# Patient Record
Sex: FEMALE | Race: WHITE | NOT HISPANIC OR LATINO | Employment: OTHER | RURAL
[De-identification: names, ages, dates, MRNs, and addresses within clinical notes are randomized per-mention and may not be internally consistent; named-entity substitution may affect disease eponyms.]

---

## 2021-06-22 RX ORDER — DONEPEZIL HYDROCHLORIDE 10 MG/1
1 TABLET, FILM COATED ORAL NIGHTLY
COMMUNITY
Start: 2020-09-10 | End: 2021-12-17 | Stop reason: SDUPTHER

## 2021-06-22 RX ORDER — GABAPENTIN 100 MG/1
1 CAPSULE ORAL 3 TIMES DAILY
COMMUNITY
Start: 2020-09-10 | End: 2021-10-14 | Stop reason: SDUPTHER

## 2021-06-22 RX ORDER — MELOXICAM 7.5 MG/1
1 TABLET ORAL 2 TIMES DAILY WITH MEALS
COMMUNITY
Start: 2020-09-10 | End: 2022-09-06

## 2021-06-22 RX ORDER — ESCITALOPRAM OXALATE 20 MG/1
1 TABLET ORAL DAILY
COMMUNITY
Start: 2020-09-10 | End: 2021-12-17 | Stop reason: SDUPTHER

## 2021-06-22 RX ORDER — MEMANTINE HYDROCHLORIDE 28 MG/1
1 CAPSULE, EXTENDED RELEASE ORAL DAILY
COMMUNITY
Start: 2020-09-10 | End: 2021-12-17 | Stop reason: SDUPTHER

## 2021-06-22 RX ORDER — IBANDRONATE SODIUM 150 MG/1
1 TABLET, FILM COATED ORAL
COMMUNITY
Start: 2020-09-10 | End: 2022-09-06 | Stop reason: SDUPTHER

## 2021-07-06 ENCOUNTER — OFFICE VISIT (OUTPATIENT)
Dept: FAMILY MEDICINE | Facility: CLINIC | Age: 86
End: 2021-07-06
Payer: MEDICARE

## 2021-07-06 VITALS
WEIGHT: 144 LBS | HEIGHT: 64 IN | TEMPERATURE: 98 F | OXYGEN SATURATION: 94 % | DIASTOLIC BLOOD PRESSURE: 83 MMHG | SYSTOLIC BLOOD PRESSURE: 149 MMHG | RESPIRATION RATE: 18 BRPM | HEART RATE: 83 BPM | BODY MASS INDEX: 24.59 KG/M2

## 2021-07-06 DIAGNOSIS — E07.9 DISEASE OF THYROID GLAND: ICD-10-CM

## 2021-07-06 DIAGNOSIS — Z00.00 ANNUAL PHYSICAL EXAM: Primary | ICD-10-CM

## 2021-07-06 DIAGNOSIS — Z85.72 HISTORY OF LYMPHOMA: ICD-10-CM

## 2021-07-06 DIAGNOSIS — G30.9 DEMENTIA DUE TO ALZHEIMER'S DISEASE: ICD-10-CM

## 2021-07-06 DIAGNOSIS — F02.80 DEMENTIA DUE TO ALZHEIMER'S DISEASE: ICD-10-CM

## 2021-07-06 DIAGNOSIS — L81.9 PIGMENTED SKIN LESIONS: ICD-10-CM

## 2021-07-06 DIAGNOSIS — H61.20 EXCESSIVE CERUMEN IN EAR CANAL, UNSPECIFIED LATERALITY: ICD-10-CM

## 2021-07-06 LAB
ANION GAP SERPL CALCULATED.3IONS-SCNC: 9 MMOL/L (ref 7–16)
BASOPHILS # BLD AUTO: 0.07 K/UL (ref 0–0.2)
BASOPHILS NFR BLD AUTO: 0.7 % (ref 0–1)
BUN SERPL-MCNC: 18 MG/DL (ref 7–18)
BUN/CREAT SERPL: 18 (ref 6–20)
CALCIUM SERPL-MCNC: 9.1 MG/DL (ref 8.5–10.1)
CHLORIDE SERPL-SCNC: 107 MMOL/L (ref 98–107)
CO2 SERPL-SCNC: 29 MMOL/L (ref 21–32)
CREAT SERPL-MCNC: 1.02 MG/DL (ref 0.55–1.02)
DIFFERENTIAL METHOD BLD: ABNORMAL
EOSINOPHIL # BLD AUTO: 0.11 K/UL (ref 0–0.5)
EOSINOPHIL NFR BLD AUTO: 1.1 % (ref 1–4)
ERYTHROCYTE [DISTWIDTH] IN BLOOD BY AUTOMATED COUNT: 14.3 % (ref 11.5–14.5)
GLUCOSE SERPL-MCNC: 91 MG/DL (ref 74–106)
HCT VFR BLD AUTO: 39 % (ref 38–47)
HGB BLD-MCNC: 12.1 G/DL (ref 12–16)
IMM GRANULOCYTES # BLD AUTO: 0.03 K/UL (ref 0–0.04)
IMM GRANULOCYTES NFR BLD: 0.3 % (ref 0–0.4)
LYMPHOCYTES # BLD AUTO: 2.22 K/UL (ref 1–4.8)
LYMPHOCYTES NFR BLD AUTO: 21.6 % (ref 27–41)
MCH RBC QN AUTO: 28.5 PG (ref 27–31)
MCHC RBC AUTO-ENTMCNC: 31 G/DL (ref 32–36)
MCV RBC AUTO: 91.8 FL (ref 80–96)
MONOCYTES # BLD AUTO: 0.85 K/UL (ref 0–0.8)
MONOCYTES NFR BLD AUTO: 8.3 % (ref 2–6)
MPC BLD CALC-MCNC: 11.5 FL (ref 9.4–12.4)
NEUTROPHILS # BLD AUTO: 7.01 K/UL (ref 1.8–7.7)
NEUTROPHILS NFR BLD AUTO: 68 % (ref 53–65)
NRBC # BLD AUTO: 0 X10E3/UL
NRBC, AUTO (.00): 0 %
PLATELET # BLD AUTO: 303 K/UL (ref 150–400)
POTASSIUM SERPL-SCNC: 3.9 MMOL/L (ref 3.5–5.1)
RBC # BLD AUTO: 4.25 M/UL (ref 4.2–5.4)
SODIUM SERPL-SCNC: 141 MMOL/L (ref 136–145)
TSH SERPL DL<=0.005 MIU/L-ACNC: 2.73 UIU/ML (ref 0.36–3.74)
WBC # BLD AUTO: 10.29 K/UL (ref 4.5–11)

## 2021-07-06 PROCEDURE — 84443 ASSAY THYROID STIM HORMONE: CPT | Mod: ICN,,, | Performed by: CLINICAL MEDICAL LABORATORY

## 2021-07-06 PROCEDURE — 85025 COMPLETE CBC W/AUTO DIFF WBC: CPT | Mod: ICN,,, | Performed by: CLINICAL MEDICAL LABORATORY

## 2021-07-06 PROCEDURE — 99213 OFFICE O/P EST LOW 20 MIN: CPT | Mod: GC,,, | Performed by: FAMILY MEDICINE

## 2021-07-06 PROCEDURE — 80048 BASIC METABOLIC PNL TOTAL CA: CPT | Mod: ICN,,, | Performed by: CLINICAL MEDICAL LABORATORY

## 2021-07-06 PROCEDURE — 85025 CBC WITH DIFFERENTIAL: ICD-10-PCS | Mod: ICN,,, | Performed by: CLINICAL MEDICAL LABORATORY

## 2021-07-06 PROCEDURE — 84443 TSH: ICD-10-PCS | Mod: ICN,,, | Performed by: CLINICAL MEDICAL LABORATORY

## 2021-07-06 PROCEDURE — 80048 BASIC METABOLIC PANEL: ICD-10-PCS | Mod: ICN,,, | Performed by: CLINICAL MEDICAL LABORATORY

## 2021-07-06 PROCEDURE — 99213 PR OFFICE/OUTPT VISIT, EST, LEVL III, 20-29 MIN: ICD-10-PCS | Mod: GC,,, | Performed by: FAMILY MEDICINE

## 2021-07-06 RX ORDER — ACETAMINOPHEN AND CODEINE PHOSPHATE 300; 30 MG/1; MG/1
TABLET ORAL
COMMUNITY
End: 2021-07-06 | Stop reason: ALTCHOICE

## 2021-07-06 RX ORDER — DICLOFENAC SODIUM 10 MG/G
2 GEL TOPICAL DAILY
COMMUNITY
End: 2022-09-06 | Stop reason: SDUPTHER

## 2021-07-13 ENCOUNTER — OFFICE VISIT (OUTPATIENT)
Dept: OTOLARYNGOLOGY | Facility: CLINIC | Age: 86
End: 2021-07-13
Payer: MEDICARE

## 2021-07-13 VITALS — WEIGHT: 144 LBS | BODY MASS INDEX: 24.59 KG/M2 | HEIGHT: 64 IN

## 2021-07-13 DIAGNOSIS — H90.3 SENSORINEURAL HEARING LOSS (SNHL) OF BOTH EARS: ICD-10-CM

## 2021-07-13 DIAGNOSIS — H61.23 BILATERAL IMPACTED CERUMEN: Primary | ICD-10-CM

## 2021-07-13 PROCEDURE — 69210 REMOVE IMPACTED EAR WAX UNI: CPT | Mod: 50,PBBFAC | Performed by: OTOLARYNGOLOGY

## 2021-07-13 PROCEDURE — 69210 PR REMOVAL IMPACTED CERUMEN REQUIRING INSTRUMENTATION, UNILATERAL: ICD-10-PCS | Mod: S$PBB,,, | Performed by: OTOLARYNGOLOGY

## 2021-07-13 PROCEDURE — 69210 REMOVE IMPACTED EAR WAX UNI: CPT | Mod: S$PBB,,, | Performed by: OTOLARYNGOLOGY

## 2021-07-13 PROCEDURE — 99213 OFFICE O/P EST LOW 20 MIN: CPT | Mod: PBBFAC | Performed by: OTOLARYNGOLOGY

## 2021-07-13 PROCEDURE — 99204 OFFICE O/P NEW MOD 45 MIN: CPT | Mod: S$PBB,25,, | Performed by: OTOLARYNGOLOGY

## 2021-07-13 PROCEDURE — 99204 PR OFFICE/OUTPT VISIT, NEW, LEVL IV, 45-59 MIN: ICD-10-PCS | Mod: S$PBB,25,, | Performed by: OTOLARYNGOLOGY

## 2021-10-11 PROBLEM — Z00.00 ANNUAL PHYSICAL EXAM: Status: RESOLVED | Noted: 2021-07-06 | Resolved: 2021-10-11

## 2021-10-14 RX ORDER — GABAPENTIN 100 MG/1
100 CAPSULE ORAL 3 TIMES DAILY
Qty: 90 CAPSULE | Refills: 3 | Status: SHIPPED | OUTPATIENT
Start: 2021-10-14 | End: 2022-06-07 | Stop reason: SDUPTHER

## 2021-12-21 RX ORDER — ESCITALOPRAM OXALATE 20 MG/1
20 TABLET ORAL DAILY
Qty: 90 TABLET | Refills: 0 | Status: SHIPPED | OUTPATIENT
Start: 2021-12-21 | End: 2021-12-21

## 2021-12-21 RX ORDER — ESCITALOPRAM OXALATE 20 MG/1
20 TABLET ORAL DAILY
Qty: 30 TABLET | Refills: 2 | Status: SHIPPED | OUTPATIENT
Start: 2021-12-21 | End: 2022-06-07 | Stop reason: SDUPTHER

## 2021-12-21 RX ORDER — MEMANTINE HYDROCHLORIDE 28 MG/1
28 CAPSULE, EXTENDED RELEASE ORAL DAILY
Qty: 30 CAPSULE | Refills: 2 | Status: SHIPPED | OUTPATIENT
Start: 2021-12-21 | End: 2022-03-21

## 2021-12-21 RX ORDER — MEMANTINE HYDROCHLORIDE 28 MG/1
28 CAPSULE, EXTENDED RELEASE ORAL DAILY
Qty: 90 CAPSULE | Refills: 0 | Status: SHIPPED | OUTPATIENT
Start: 2021-12-21 | End: 2021-12-21

## 2021-12-21 RX ORDER — DONEPEZIL HYDROCHLORIDE 10 MG/1
10 TABLET, FILM COATED ORAL NIGHTLY
Qty: 90 TABLET | Refills: 0 | Status: SHIPPED | OUTPATIENT
Start: 2021-12-21 | End: 2021-12-21

## 2021-12-21 RX ORDER — DONEPEZIL HYDROCHLORIDE 10 MG/1
10 TABLET, FILM COATED ORAL NIGHTLY
Qty: 30 TABLET | Refills: 2 | Status: SHIPPED | OUTPATIENT
Start: 2021-12-21 | End: 2022-06-07 | Stop reason: ALTCHOICE

## 2022-06-07 ENCOUNTER — OFFICE VISIT (OUTPATIENT)
Dept: FAMILY MEDICINE | Facility: CLINIC | Age: 87
End: 2022-06-07
Payer: MEDICARE

## 2022-06-07 VITALS
WEIGHT: 138.81 LBS | HEART RATE: 74 BPM | OXYGEN SATURATION: 95 % | TEMPERATURE: 98 F | SYSTOLIC BLOOD PRESSURE: 137 MMHG | BODY MASS INDEX: 23.82 KG/M2 | DIASTOLIC BLOOD PRESSURE: 75 MMHG

## 2022-06-07 DIAGNOSIS — F32.A DEPRESSION, UNSPECIFIED DEPRESSION TYPE: ICD-10-CM

## 2022-06-07 DIAGNOSIS — G30.9 ALZHEIMER DISEASE: Primary | ICD-10-CM

## 2022-06-07 DIAGNOSIS — Z00.00 ANNUAL VISIT FOR GENERAL ADULT MEDICAL EXAMINATION WITHOUT ABNORMAL FINDINGS: ICD-10-CM

## 2022-06-07 DIAGNOSIS — Z85.72 HISTORY OF LYMPHOMA: ICD-10-CM

## 2022-06-07 DIAGNOSIS — L98.9 SKIN LESIONS: ICD-10-CM

## 2022-06-07 DIAGNOSIS — G62.9 NEUROPATHY: ICD-10-CM

## 2022-06-07 DIAGNOSIS — F02.80 ALZHEIMER DISEASE: Primary | ICD-10-CM

## 2022-06-07 LAB
BASOPHILS # BLD AUTO: 0.08 K/UL (ref 0–0.2)
BASOPHILS NFR BLD AUTO: 0.8 % (ref 0–1)
DIFFERENTIAL METHOD BLD: ABNORMAL
EOSINOPHIL # BLD AUTO: 0.11 K/UL (ref 0–0.5)
EOSINOPHIL NFR BLD AUTO: 1.2 % (ref 1–4)
ERYTHROCYTE [DISTWIDTH] IN BLOOD BY AUTOMATED COUNT: 14.3 % (ref 11.5–14.5)
HCT VFR BLD AUTO: 38 % (ref 38–47)
HGB BLD-MCNC: 12.2 G/DL (ref 12–16)
IMM GRANULOCYTES # BLD AUTO: 0.03 K/UL (ref 0–0.04)
IMM GRANULOCYTES NFR BLD: 0.3 % (ref 0–0.4)
LYMPHOCYTES # BLD AUTO: 2.41 K/UL (ref 1–4.8)
LYMPHOCYTES NFR BLD AUTO: 25.3 % (ref 27–41)
MCH RBC QN AUTO: 30.3 PG (ref 27–31)
MCHC RBC AUTO-ENTMCNC: 32.1 G/DL (ref 32–36)
MCV RBC AUTO: 94.3 FL (ref 80–96)
MONOCYTES # BLD AUTO: 0.81 K/UL (ref 0–0.8)
MONOCYTES NFR BLD AUTO: 8.5 % (ref 2–6)
MPC BLD CALC-MCNC: 11.4 FL (ref 9.4–12.4)
NEUTROPHILS # BLD AUTO: 6.1 K/UL (ref 1.8–7.7)
NEUTROPHILS NFR BLD AUTO: 63.9 % (ref 53–65)
NRBC # BLD AUTO: 0 X10E3/UL
NRBC, AUTO (.00): 0 %
PLATELET # BLD AUTO: 248 K/UL (ref 150–400)
RBC # BLD AUTO: 4.03 M/UL (ref 4.2–5.4)
WBC # BLD AUTO: 9.54 K/UL (ref 4.5–11)

## 2022-06-07 PROCEDURE — 99214 PR OFFICE/OUTPT VISIT, EST, LEVL IV, 30-39 MIN: ICD-10-PCS | Mod: GC,,, | Performed by: SPECIALIST

## 2022-06-07 PROCEDURE — 99214 OFFICE O/P EST MOD 30 MIN: CPT | Mod: GC,,, | Performed by: SPECIALIST

## 2022-06-07 PROCEDURE — 80053 COMPREHEN METABOLIC PANEL: CPT | Mod: ,,, | Performed by: CLINICAL MEDICAL LABORATORY

## 2022-06-07 PROCEDURE — 85025 CBC WITH DIFFERENTIAL: ICD-10-PCS | Mod: ,,, | Performed by: CLINICAL MEDICAL LABORATORY

## 2022-06-07 PROCEDURE — 85025 COMPLETE CBC W/AUTO DIFF WBC: CPT | Mod: ,,, | Performed by: CLINICAL MEDICAL LABORATORY

## 2022-06-07 PROCEDURE — 80053 COMPREHENSIVE METABOLIC PANEL: ICD-10-PCS | Mod: ,,, | Performed by: CLINICAL MEDICAL LABORATORY

## 2022-06-07 RX ORDER — GABAPENTIN 100 MG/1
100 CAPSULE ORAL 3 TIMES DAILY
Qty: 270 CAPSULE | Refills: 1 | Status: SHIPPED | OUTPATIENT
Start: 2022-06-07 | End: 2022-09-06 | Stop reason: SDUPTHER

## 2022-06-07 RX ORDER — ESCITALOPRAM OXALATE 20 MG/1
20 TABLET ORAL DAILY
Qty: 90 TABLET | Refills: 1 | Status: SHIPPED | OUTPATIENT
Start: 2022-06-07 | End: 2022-08-31 | Stop reason: SDUPTHER

## 2022-06-07 RX ORDER — DONEPEZIL HYDROCHLORIDE 5 MG/1
5 TABLET, FILM COATED ORAL 2 TIMES DAILY
Qty: 180 TABLET | Refills: 1 | Status: SHIPPED | OUTPATIENT
Start: 2022-06-07 | End: 2022-09-06 | Stop reason: SDUPTHER

## 2022-06-07 RX ORDER — MEMANTINE HYDROCHLORIDE 7 MG/1
7 CAPSULE, EXTENDED RELEASE ORAL 3 TIMES DAILY
Qty: 270 CAPSULE | Refills: 1 | Status: SHIPPED | OUTPATIENT
Start: 2022-06-07 | End: 2022-09-06 | Stop reason: SDUPTHER

## 2022-06-07 RX ORDER — MEMANTINE HYDROCHLORIDE 28 MG/1
1 CAPSULE, EXTENDED RELEASE ORAL DAILY
COMMUNITY
Start: 2022-05-08 | End: 2022-06-07 | Stop reason: ALTCHOICE

## 2022-06-07 NOTE — PROGRESS NOTES
"Subjective:       Patient ID: Acacia Mcgrath is a 89 y.o. female.    Chief Complaint: Medication Refill (Gabapentin, aricept, lexapro, memantine ) and Follow-up (Frequent falls )    89 year old female came in for a follow up and medication refill. Patient has PMHx of dementia and daughter reports she has become " out of it" recently when she takes her medications. She believes her medications are too strong for her and she would like her to try a lower dose of these medications. Patient also has multiple skin lesions on the forehead and all over the body.  Patient would like to refill all her medications.     Medication Refill  Associated symptoms include a rash.   Follow-up  Associated symptoms include a rash.     Review of Systems   Constitutional: Positive for activity change.   HENT: Positive for hearing loss.    Integumentary:  Positive for rash.   Neurological: Positive for disturbances in coordination and coordination difficulties.   Psychiatric/Behavioral: Positive for confusion, dysphoric mood and sleep disturbance.   All other systems reviewed and are negative.        Objective:      Physical Exam  Constitutional:       General: She is not in acute distress.     Appearance: Normal appearance. She is not toxic-appearing.   HENT:      Head: Normocephalic.      Right Ear: External ear normal. There is impacted cerumen.      Left Ear: External ear normal. There is impacted cerumen.      Nose: Nose normal.      Mouth/Throat:      Mouth: Mucous membranes are moist.   Eyes:      General:         Right eye: No discharge.         Left eye: No discharge.      Conjunctiva/sclera: Conjunctivae normal.   Cardiovascular:      Rate and Rhythm: Regular rhythm. Bradycardia present.      Pulses: Normal pulses.      Heart sounds: Normal heart sounds. No murmur heard.    No friction rub.   Pulmonary:      Effort: Pulmonary effort is normal. No respiratory distress.      Breath sounds: Normal breath sounds. No wheezing. "   Skin:     Findings: Lesion and rash present.   Neurological:      Mental Status: She is alert and oriented to person, place, and time.   Psychiatric:         Behavior: Behavior normal.         Assessment:       Problem List Items Addressed This Visit        Oncology    History of lymphoma    Relevant Orders    Comprehensive Metabolic Panel    CBC Auto Differential      Other Visit Diagnoses     Alzheimer disease    -  Primary    Relevant Medications    memantine (NAMENDA XR) 7 mg CSpX    donepeziL (ARICEPT) 5 MG tablet    Neuropathy        Relevant Medications    gabapentin (NEURONTIN) 100 MG capsule    Depression, unspecified depression type        Relevant Medications    EScitalopram oxalate (LEXAPRO) 20 MG tablet    Skin lesions        Relevant Orders    Ambulatory referral/consult to Dermatology    Annual visit for general adult medical examination without abnormal findings        Relevant Orders    Comprehensive Metabolic Panel    CBC Auto Differential          Plan:       Alzheimer disease  -     memantine (NAMENDA XR) 7 mg CSpX; Take 1 capsule (7 mg total) by mouth 3 (three) times daily.  Dispense: 270 capsule; Refill: 1  -     donepeziL (ARICEPT) 5 MG tablet; Take 1 tablet (5 mg total) by mouth 2 (two) times a day.  Dispense: 180 tablet; Refill: 1    Neuropathy  -     gabapentin (NEURONTIN) 100 MG capsule; Take 1 capsule (100 mg total) by mouth 3 (three) times daily.  Dispense: 270 capsule; Refill: 1    Depression, unspecified depression type  -     EScitalopram oxalate (LEXAPRO) 20 MG tablet; Take 1 tablet (20 mg total) by mouth Daily.  Dispense: 90 tablet; Refill: 1    Skin lesions  -     Ambulatory referral/consult to Dermatology; Future; Expected date: 06/14/2022    Annual visit for general adult medical examination without abnormal findings  -     Comprehensive Metabolic Panel; Future; Expected date: 06/07/2022  -     CBC Auto Differential; Future; Expected date: 06/07/2022    History of lymphoma  -      Comprehensive Metabolic Panel; Future; Expected date: 06/07/2022  -     CBC Auto Differential; Future; Expected date: 06/07/2022

## 2022-06-07 NOTE — ASSESSMENT & PLAN NOTE
Patient was on Donepezil 10 mg and we changed the dosage to 5 mg BID. Patient will start taking 5 mg daily and if she did not tolerate it she will continue the 10 mg  Patient was taking memantine 28 mg but she will start taking memantine 7mg  x3 times daily

## 2022-06-08 LAB
ALBUMIN SERPL BCP-MCNC: 3.5 G/DL (ref 3.5–5)
ALBUMIN/GLOB SERPL: 1.1 {RATIO}
ALP SERPL-CCNC: 102 U/L (ref 55–142)
ALT SERPL W P-5'-P-CCNC: 19 U/L (ref 13–56)
ANION GAP SERPL CALCULATED.3IONS-SCNC: 12 MMOL/L (ref 7–16)
AST SERPL W P-5'-P-CCNC: 16 U/L (ref 15–37)
BILIRUB SERPL-MCNC: 0.4 MG/DL (ref 0–1.2)
BUN SERPL-MCNC: 15 MG/DL (ref 7–18)
BUN/CREAT SERPL: 16 (ref 6–20)
CALCIUM SERPL-MCNC: 9.4 MG/DL (ref 8.5–10.1)
CHLORIDE SERPL-SCNC: 104 MMOL/L (ref 98–107)
CO2 SERPL-SCNC: 29 MMOL/L (ref 21–32)
CREAT SERPL-MCNC: 0.92 MG/DL (ref 0.55–1.02)
GLOBULIN SER-MCNC: 3.1 G/DL (ref 2–4)
GLUCOSE SERPL-MCNC: 91 MG/DL (ref 74–106)
POTASSIUM SERPL-SCNC: 4.3 MMOL/L (ref 3.5–5.1)
PROT SERPL-MCNC: 6.6 G/DL (ref 6.4–8.2)
SODIUM SERPL-SCNC: 141 MMOL/L (ref 136–145)

## 2022-08-31 DIAGNOSIS — F32.A DEPRESSION, UNSPECIFIED DEPRESSION TYPE: ICD-10-CM

## 2022-09-01 NOTE — TELEPHONE ENCOUNTER
----- Message from Juan Luis Moscoso sent at 9/1/2022  8:34 AM CDT -----  EScitalopram oxalate to [facility] and also needs a home visit [phone]

## 2022-09-06 ENCOUNTER — OFFICE VISIT (OUTPATIENT)
Dept: FAMILY MEDICINE | Facility: CLINIC | Age: 87
End: 2022-09-06
Payer: MEDICARE

## 2022-09-06 VITALS
HEART RATE: 88 BPM | WEIGHT: 137.63 LBS | BODY MASS INDEX: 23.62 KG/M2 | DIASTOLIC BLOOD PRESSURE: 75 MMHG | SYSTOLIC BLOOD PRESSURE: 155 MMHG | OXYGEN SATURATION: 96 % | TEMPERATURE: 99 F

## 2022-09-06 DIAGNOSIS — F02.80 ALZHEIMER DISEASE: ICD-10-CM

## 2022-09-06 DIAGNOSIS — F32.A DEPRESSION, UNSPECIFIED DEPRESSION TYPE: ICD-10-CM

## 2022-09-06 DIAGNOSIS — Z23 IMMUNIZATION DUE: ICD-10-CM

## 2022-09-06 DIAGNOSIS — Z23 FLU VACCINE NEED: Primary | ICD-10-CM

## 2022-09-06 DIAGNOSIS — G62.9 NEUROPATHY: ICD-10-CM

## 2022-09-06 DIAGNOSIS — Z23: ICD-10-CM

## 2022-09-06 DIAGNOSIS — Z53.20 PATIENT REFUSED EVALUATION OR TREATMENT: ICD-10-CM

## 2022-09-06 DIAGNOSIS — G30.9 ALZHEIMER DISEASE: ICD-10-CM

## 2022-09-06 DIAGNOSIS — M81.0 OSTEOPOROSIS, UNSPECIFIED OSTEOPOROSIS TYPE, UNSPECIFIED PATHOLOGICAL FRACTURE PRESENCE: ICD-10-CM

## 2022-09-06 PROCEDURE — 99213 OFFICE O/P EST LOW 20 MIN: CPT | Mod: GC,,, | Performed by: FAMILY MEDICINE

## 2022-09-06 PROCEDURE — G0009 ADMIN PNEUMOCOCCAL VACCINE: HCPCS | Mod: GC,,, | Performed by: FAMILY MEDICINE

## 2022-09-06 PROCEDURE — 90677 PNEUMOCOCCAL CONJUGATE VACCINE 20-VALENT: ICD-10-PCS | Mod: GC,,, | Performed by: FAMILY MEDICINE

## 2022-09-06 PROCEDURE — G0009 PNEUMOCOCCAL CONJUGATE VACCINE 20-VALENT: ICD-10-PCS | Mod: GC,,, | Performed by: FAMILY MEDICINE

## 2022-09-06 PROCEDURE — 99213 PR OFFICE/OUTPT VISIT, EST, LEVL III, 20-29 MIN: ICD-10-PCS | Mod: GC,,, | Performed by: FAMILY MEDICINE

## 2022-09-06 PROCEDURE — 90677 PCV20 VACCINE IM: CPT | Mod: GC,,, | Performed by: FAMILY MEDICINE

## 2022-09-06 RX ORDER — ESCITALOPRAM OXALATE 20 MG/1
20 TABLET ORAL DAILY
Qty: 90 TABLET | Refills: 1 | Status: SHIPPED | OUTPATIENT
Start: 2022-09-06 | End: 2022-09-06 | Stop reason: SDUPTHER

## 2022-09-06 RX ORDER — DONEPEZIL HYDROCHLORIDE 5 MG/1
5 TABLET, FILM COATED ORAL 2 TIMES DAILY
Qty: 180 TABLET | Refills: 1 | Status: SHIPPED | OUTPATIENT
Start: 2022-09-06 | End: 2022-12-12 | Stop reason: SDUPTHER

## 2022-09-06 RX ORDER — MEMANTINE HYDROCHLORIDE 7 MG/1
7 CAPSULE, EXTENDED RELEASE ORAL 3 TIMES DAILY
Qty: 270 CAPSULE | Refills: 1 | Status: SHIPPED | OUTPATIENT
Start: 2022-09-06 | End: 2022-12-12 | Stop reason: SDUPTHER

## 2022-09-06 RX ORDER — ESCITALOPRAM OXALATE 20 MG/1
20 TABLET ORAL DAILY
Qty: 90 TABLET | Refills: 1 | Status: SHIPPED | OUTPATIENT
Start: 2022-09-06 | End: 2022-12-12 | Stop reason: SDUPTHER

## 2022-09-06 RX ORDER — DICLOFENAC SODIUM 10 MG/G
2 GEL TOPICAL DAILY
Qty: 200 G | Refills: 0 | Status: SHIPPED | OUTPATIENT
Start: 2022-09-06 | End: 2022-12-12 | Stop reason: SDUPTHER

## 2022-09-06 RX ORDER — IBANDRONATE SODIUM 150 MG/1
150 TABLET, FILM COATED ORAL
Qty: 3 TABLET | Refills: 1 | Status: SHIPPED | OUTPATIENT
Start: 2022-09-06 | End: 2022-12-12 | Stop reason: SDUPTHER

## 2022-09-06 RX ORDER — GABAPENTIN 100 MG/1
100 CAPSULE ORAL 3 TIMES DAILY
Qty: 270 CAPSULE | Refills: 1 | Status: SHIPPED | OUTPATIENT
Start: 2022-09-06 | End: 2022-12-12 | Stop reason: SDUPTHER

## 2022-09-06 NOTE — PROGRESS NOTES
Subjective:       Patient ID: Acacia Mcgrath is a 89 y.o. female.    Chief Complaint: Follow-up and Medication Refill (Aricept, lexapro, neurontin, namenda)    89 year old female with PMHx of dementia came in for a follow up and refilling medications. Patient has been doing well and she is in similar state since last visit. Daughter reports she has made sure she is compliant with her medications. She has not had a flu shot, 4th covid booster, second pneumococcal after 65 and no shingrix. Patient used to take mobic and voltaren gel for arthritis but daughter reports she has had not recent complaints of pain and arthritis therefore she has not took these medications. She does take her gabapentin for nerve pains. Patient stopped taking ibandronate in the past even though she has been diagnosed with osteoporosis.   Review of Systems   Constitutional:  Positive for activity change.   HENT:  Positive for hearing loss.    Integumentary:  Positive for rash.   Neurological:  Positive for coordination difficulties and coordination difficulties.   Psychiatric/Behavioral:  Positive for confusion, dysphoric mood and sleep disturbance.    All other systems reviewed and are negative.      Objective:      Physical Exam  Constitutional:       General: She is not in acute distress.     Appearance: Normal appearance. She is not toxic-appearing.   HENT:      Head: Normocephalic.      Right Ear: External ear normal.      Left Ear: External ear normal.      Nose: Nose normal.      Mouth/Throat:      Mouth: Mucous membranes are moist.   Eyes:      General:         Right eye: No discharge.         Left eye: No discharge.      Conjunctiva/sclera: Conjunctivae normal.   Cardiovascular:      Rate and Rhythm: Regular rhythm. Bradycardia present.      Pulses: Normal pulses.      Heart sounds: Normal heart sounds. No murmur heard.    No friction rub.   Pulmonary:      Effort: Pulmonary effort is normal. No respiratory distress.      Breath  sounds: Normal breath sounds. No wheezing.   Skin:     Findings: Lesion and rash present.   Neurological:      Mental Status: She is alert and oriented to person, place, and time.   Psychiatric:         Behavior: Behavior normal.       Assessment:       Problem List Items Addressed This Visit          Neuro    Alzheimer disease     Meds were refilled         Relevant Medications    donepeziL (ARICEPT) 5 MG tablet    memantine (NAMENDA XR) 7 mg CSpX    Neuropathy     Gabapentin was refilled         Relevant Medications    gabapentin (NEURONTIN) 100 MG capsule       Psychiatric    Depression     lexapro was refilled         Relevant Medications    EScitalopram oxalate (LEXAPRO) 20 MG tablet       ID    Pneumococcal 7-valent conjugate vaccine administered     Vaccine was administered today         Relevant Orders    (In Office Administered) Pneumococcal Polysaccharide Vaccine (23 Valent) (SQ/IM)    Flu vaccine need - Primary     Vaccine was ordered patient will come back to get it         Relevant Orders    Influenza - Quadrivalent *Preferred* (6 months+) (PF)       Palliative Care    Patient refused evaluation or treatment     Patient will return another day for covid 4th shot, flu vaccine and shingrix  Patient will need to have a lipid panel but would like to do the lab work when he is due for more lab works which will be in 6 months           Other Visit Diagnoses       Osteoporosis, unspecified osteoporosis type, unspecified pathological fracture presence        Relevant Medications    ibandronate (BONIVA) 150 mg tablet              Plan:       Flu vaccine need  -     Influenza - Quadrivalent *Preferred* (6 months+) (PF)    Pneumococcal 7-valent conjugate vaccine administered  -     (In Office Administered) Pneumococcal Polysaccharide Vaccine (23 Valent) (SQ/IM)    Alzheimer disease  -     donepeziL (ARICEPT) 5 MG tablet; Take 1 tablet (5 mg total) by mouth 2 (two) times a day.  Dispense: 180 tablet; Refill: 1  -      memantine (NAMENDA XR) 7 mg CSpX; Take 1 capsule (7 mg total) by mouth 3 (three) times daily.  Dispense: 270 capsule; Refill: 1    Depression, unspecified depression type  -     EScitalopram oxalate (LEXAPRO) 20 MG tablet; Take 1 tablet (20 mg total) by mouth Daily.  Dispense: 90 tablet; Refill: 1    Neuropathy  -     gabapentin (NEURONTIN) 100 MG capsule; Take 1 capsule (100 mg total) by mouth 3 (three) times daily.  Dispense: 270 capsule; Refill: 1    Osteoporosis, unspecified osteoporosis type, unspecified pathological fracture presence  -     ibandronate (BONIVA) 150 mg tablet; Take 1 tablet (150 mg total) by mouth every 30 days.  Dispense: 3 tablet; Refill: 1    Patient refused evaluation or treatment    Other orders  -     diclofenac sodium (VOLTAREN) 1 % Gel; Apply 2 g topically once daily.  Dispense: 200 g; Refill: 0

## 2022-09-09 DIAGNOSIS — Z71.89 COMPLEX CARE COORDINATION: ICD-10-CM

## 2022-10-04 ENCOUNTER — CLINICAL SUPPORT (OUTPATIENT)
Dept: FAMILY MEDICINE | Facility: CLINIC | Age: 87
End: 2022-10-04
Payer: MEDICARE

## 2022-10-04 DIAGNOSIS — Z23 NEED FOR VACCINATION: Primary | ICD-10-CM

## 2022-10-04 PROCEDURE — G0008 ADMIN INFLUENZA VIRUS VAC: HCPCS | Mod: ,,, | Performed by: FAMILY MEDICINE

## 2022-10-04 PROCEDURE — 0134A COVID-19, MRNA, LNP-S, BIVALENT BOOSTER, PF, 50 MCG/0.5 ML: ICD-10-PCS | Mod: ,,, | Performed by: FAMILY MEDICINE

## 2022-10-04 PROCEDURE — G0008 FLU VACCINE - QUADRIVALENT - ADJUVANTED: ICD-10-PCS | Mod: ,,, | Performed by: FAMILY MEDICINE

## 2022-10-04 PROCEDURE — 90694 FLU VACCINE - QUADRIVALENT - ADJUVANTED: ICD-10-PCS | Mod: ,,, | Performed by: FAMILY MEDICINE

## 2022-10-04 PROCEDURE — 91313 COVID-19, MRNA, LNP-S, BIVALENT BOOSTER, PF, 50 MCG/0.5 ML: CPT | Mod: ,,, | Performed by: FAMILY MEDICINE

## 2022-10-04 PROCEDURE — 0134A COVID-19, MRNA, LNP-S, BIVALENT BOOSTER, PF, 50 MCG/0.5 ML: CPT | Mod: ,,, | Performed by: FAMILY MEDICINE

## 2022-10-04 PROCEDURE — 91313 COVID-19, MRNA, LNP-S, BIVALENT BOOSTER, PF, 50 MCG/0.5 ML: ICD-10-PCS | Mod: ,,, | Performed by: FAMILY MEDICINE

## 2022-10-04 PROCEDURE — 90694 VACC AIIV4 NO PRSRV 0.5ML IM: CPT | Mod: ,,, | Performed by: FAMILY MEDICINE

## 2022-10-06 NOTE — PROGRESS NOTES
Patient ID: Acacia Mcgrath is a 89 y.o. female.    Chief Complaint: No chief complaint on file.      Acacia Mcgrath is here today for Booster Dose of Covid Vaccine & Flu vaccine    Plan:         -Patient consent form obtained, -Vaccine Information sheet given to patient, -Instructed to wait for 15 minutes after injection, -Instructed to call clinic if she experiences any side effects or has any other questions, and Time Frame for next Injection    Patient voiced well understanding.

## 2022-12-12 DIAGNOSIS — G62.9 NEUROPATHY: ICD-10-CM

## 2022-12-12 DIAGNOSIS — F32.A DEPRESSION, UNSPECIFIED DEPRESSION TYPE: ICD-10-CM

## 2022-12-12 DIAGNOSIS — M19.90 ARTHRITIS: Primary | ICD-10-CM

## 2022-12-12 DIAGNOSIS — M81.0 OSTEOPOROSIS, UNSPECIFIED OSTEOPOROSIS TYPE, UNSPECIFIED PATHOLOGICAL FRACTURE PRESENCE: ICD-10-CM

## 2022-12-12 DIAGNOSIS — G30.9 ALZHEIMER DISEASE: ICD-10-CM

## 2022-12-12 DIAGNOSIS — F02.80 ALZHEIMER DISEASE: ICD-10-CM

## 2022-12-12 RX ORDER — MEMANTINE HYDROCHLORIDE 7 MG/1
7 CAPSULE, EXTENDED RELEASE ORAL 3 TIMES DAILY
Qty: 270 CAPSULE | Refills: 1 | Status: SHIPPED | OUTPATIENT
Start: 2022-12-12 | End: 2023-06-10

## 2022-12-12 RX ORDER — ESCITALOPRAM OXALATE 20 MG/1
20 TABLET ORAL DAILY
Qty: 90 TABLET | Refills: 1 | Status: SHIPPED | OUTPATIENT
Start: 2022-12-12 | End: 2023-03-30 | Stop reason: SDUPTHER

## 2022-12-12 RX ORDER — DICLOFENAC SODIUM 10 MG/G
2 GEL TOPICAL DAILY
Qty: 200 G | Refills: 0 | Status: SHIPPED | OUTPATIENT
Start: 2022-12-12 | End: 2023-04-09

## 2022-12-12 RX ORDER — DONEPEZIL HYDROCHLORIDE 5 MG/1
5 TABLET, FILM COATED ORAL 2 TIMES DAILY
Qty: 180 TABLET | Refills: 1 | Status: SHIPPED | OUTPATIENT
Start: 2022-12-12 | End: 2023-06-10

## 2022-12-12 RX ORDER — IBANDRONATE SODIUM 150 MG/1
150 TABLET, FILM COATED ORAL
Qty: 3 TABLET | Refills: 1 | Status: SHIPPED | OUTPATIENT
Start: 2022-12-12 | End: 2023-06-20

## 2022-12-12 RX ORDER — GABAPENTIN 100 MG/1
100 CAPSULE ORAL 3 TIMES DAILY
Qty: 270 CAPSULE | Refills: 1 | Status: ON HOLD | OUTPATIENT
Start: 2022-12-12 | End: 2023-04-13 | Stop reason: HOSPADM

## 2023-03-30 PROCEDURE — 80053 COMPREHENSIVE METABOLIC PANEL: ICD-10-PCS | Mod: ,,, | Performed by: CLINICAL MEDICAL LABORATORY

## 2023-03-30 PROCEDURE — 85025 COMPLETE CBC W/AUTO DIFF WBC: CPT | Performed by: NURSE PRACTITIONER

## 2023-03-30 PROCEDURE — 80053 COMPREHEN METABOLIC PANEL: CPT | Mod: ,,, | Performed by: CLINICAL MEDICAL LABORATORY

## 2023-04-04 RX ORDER — NITROFURANTOIN 25; 75 MG/1; MG/1
100 CAPSULE ORAL 2 TIMES DAILY
Qty: 10 CAPSULE | Refills: 0 | Status: ON HOLD | OUTPATIENT
Start: 2023-04-04 | End: 2023-04-13 | Stop reason: HOSPADM

## 2023-04-08 ENCOUNTER — HOSPITAL ENCOUNTER (OUTPATIENT)
Facility: HOSPITAL | Age: 88
Discharge: HOME | End: 2023-04-14
Attending: EMERGENCY MEDICINE | Admitting: INTERNAL MEDICINE
Payer: MEDICARE

## 2023-04-08 DIAGNOSIS — F02.80 ALZHEIMER DISEASE: ICD-10-CM

## 2023-04-08 DIAGNOSIS — R41.82 ALTERED MENTAL STATUS: ICD-10-CM

## 2023-04-08 DIAGNOSIS — G30.9 ALZHEIMER DISEASE: ICD-10-CM

## 2023-04-08 DIAGNOSIS — I10 PRIMARY HYPERTENSION: ICD-10-CM

## 2023-04-08 DIAGNOSIS — G93.41 ENCEPHALOPATHY, METABOLIC: Primary | ICD-10-CM

## 2023-04-08 DIAGNOSIS — F03.93 SENILE DEMENTIA WITH DEPRESSION: ICD-10-CM

## 2023-04-08 DIAGNOSIS — R07.9 CHEST PAIN: ICD-10-CM

## 2023-04-08 LAB
ALBUMIN SERPL BCP-MCNC: 3.3 G/DL (ref 3.5–5)
ALBUMIN/GLOB SERPL: 1 {RATIO}
ALP SERPL-CCNC: 82 U/L (ref 55–142)
ALT SERPL W P-5'-P-CCNC: 23 U/L (ref 13–56)
ANION GAP SERPL CALCULATED.3IONS-SCNC: 12 MMOL/L (ref 7–16)
AST SERPL W P-5'-P-CCNC: 17 U/L (ref 15–37)
BASOPHILS # BLD AUTO: 0.06 K/UL (ref 0–0.2)
BASOPHILS NFR BLD AUTO: 0.7 % (ref 0–1)
BILIRUB SERPL-MCNC: 0.3 MG/DL (ref ?–1.2)
BILIRUB UR QL STRIP: NEGATIVE
BUN SERPL-MCNC: 16 MG/DL (ref 7–18)
BUN/CREAT SERPL: 15 (ref 6–20)
CALCIUM SERPL-MCNC: 9.5 MG/DL (ref 8.5–10.1)
CHLORIDE SERPL-SCNC: 102 MMOL/L (ref 98–107)
CLARITY UR: CLEAR
CO2 SERPL-SCNC: 30 MMOL/L (ref 21–32)
COLOR UR: YELLOW
CREAT SERPL-MCNC: 1.1 MG/DL (ref 0.55–1.02)
DIFFERENTIAL METHOD BLD: ABNORMAL
EGFR (NO RACE VARIABLE) (RUSH/TITUS): 48 ML/MIN/1.73M²
EOSINOPHIL # BLD AUTO: 0.09 K/UL (ref 0–0.5)
EOSINOPHIL NFR BLD AUTO: 1 % (ref 1–4)
ERYTHROCYTE [DISTWIDTH] IN BLOOD BY AUTOMATED COUNT: 14.3 % (ref 11.5–14.5)
GLOBULIN SER-MCNC: 3.4 G/DL (ref 2–4)
GLUCOSE SERPL-MCNC: 117 MG/DL (ref 74–106)
GLUCOSE UR STRIP-MCNC: NORMAL MG/DL
HCT VFR BLD AUTO: 38.6 % (ref 38–47)
HGB BLD-MCNC: 12.1 G/DL (ref 12–16)
IMM GRANULOCYTES # BLD AUTO: 0.04 K/UL (ref 0–0.04)
IMM GRANULOCYTES NFR BLD: 0.5 % (ref 0–0.4)
KETONES UR STRIP-SCNC: NEGATIVE MG/DL
LACTATE SERPL-SCNC: 1.4 MMOL/L (ref 0.4–2)
LEUKOCYTE ESTERASE UR QL STRIP: NEGATIVE
LYMPHOCYTES # BLD AUTO: 2.02 K/UL (ref 1–4.8)
LYMPHOCYTES NFR BLD AUTO: 23.4 % (ref 27–41)
MCH RBC QN AUTO: 29.2 PG (ref 27–31)
MCHC RBC AUTO-ENTMCNC: 31.3 G/DL (ref 32–36)
MCV RBC AUTO: 93.2 FL (ref 80–96)
MONOCYTES # BLD AUTO: 0.71 K/UL (ref 0–0.8)
MONOCYTES NFR BLD AUTO: 8.2 % (ref 2–6)
MPC BLD CALC-MCNC: 10.7 FL (ref 9.4–12.4)
NEUTROPHILS # BLD AUTO: 5.72 K/UL (ref 1.8–7.7)
NEUTROPHILS NFR BLD AUTO: 66.2 % (ref 53–65)
NITRITE UR QL STRIP: NEGATIVE
NRBC # BLD AUTO: 0 X10E3/UL
NRBC, AUTO (.00): 0 %
PH UR STRIP: 5.5 PH UNITS
PLATELET # BLD AUTO: 204 K/UL (ref 150–400)
POTASSIUM SERPL-SCNC: 4.1 MMOL/L (ref 3.5–5.1)
PROT SERPL-MCNC: 6.7 G/DL (ref 6.4–8.2)
PROT UR QL STRIP: 20
RBC # BLD AUTO: 4.14 M/UL (ref 4.2–5.4)
RBC # UR STRIP: NEGATIVE /UL
SODIUM SERPL-SCNC: 140 MMOL/L (ref 136–145)
SP GR UR STRIP: 1.02
UROBILINOGEN UR STRIP-ACNC: NORMAL MG/DL
WBC # BLD AUTO: 8.64 K/UL (ref 4.5–11)

## 2023-04-08 PROCEDURE — 85025 COMPLETE CBC W/AUTO DIFF WBC: CPT | Performed by: EMERGENCY MEDICINE

## 2023-04-08 PROCEDURE — 93010 ELECTROCARDIOGRAM REPORT: CPT | Mod: ,,, | Performed by: HOSPITALIST

## 2023-04-08 PROCEDURE — 81003 URINALYSIS AUTO W/O SCOPE: CPT | Performed by: EMERGENCY MEDICINE

## 2023-04-08 PROCEDURE — 93010 EKG 12-LEAD: ICD-10-PCS | Mod: ,,, | Performed by: HOSPITALIST

## 2023-04-08 PROCEDURE — 99285 EMERGENCY DEPT VISIT HI MDM: CPT | Mod: 25,CS

## 2023-04-08 PROCEDURE — 99283 EMERGENCY DEPT VISIT LOW MDM: CPT | Mod: ,,, | Performed by: EMERGENCY MEDICINE

## 2023-04-08 PROCEDURE — 93005 ELECTROCARDIOGRAM TRACING: CPT

## 2023-04-08 PROCEDURE — 99283 PR EMERGENCY DEPT VISIT,LEVEL III: ICD-10-PCS | Mod: ,,, | Performed by: EMERGENCY MEDICINE

## 2023-04-08 PROCEDURE — 80053 COMPREHEN METABOLIC PANEL: CPT | Performed by: EMERGENCY MEDICINE

## 2023-04-08 PROCEDURE — 83605 ASSAY OF LACTIC ACID: CPT | Performed by: EMERGENCY MEDICINE

## 2023-04-09 DIAGNOSIS — Z71.89 COMPLEX CARE COORDINATION: ICD-10-CM

## 2023-04-09 PROBLEM — G93.41 ENCEPHALOPATHY, METABOLIC: Status: ACTIVE | Noted: 2023-04-09

## 2023-04-09 LAB — SARS-COV-2 RDRP RESP QL NAA+PROBE: NEGATIVE

## 2023-04-09 PROCEDURE — G0378 HOSPITAL OBSERVATION PER HR: HCPCS | Mod: CS

## 2023-04-09 PROCEDURE — 63600175 PHARM REV CODE 636 W HCPCS

## 2023-04-09 PROCEDURE — 25000003 PHARM REV CODE 250: Performed by: HOSPITALIST

## 2023-04-09 PROCEDURE — 96360 HYDRATION IV INFUSION INIT: CPT

## 2023-04-09 PROCEDURE — 87635 SARS-COV-2 COVID-19 AMP PRB: CPT | Performed by: HOSPITALIST

## 2023-04-09 PROCEDURE — 99222 PR INITIAL HOSPITAL CARE,LEVL II: ICD-10-PCS | Mod: ,,, | Performed by: HOSPITALIST

## 2023-04-09 PROCEDURE — 99222 1ST HOSP IP/OBS MODERATE 55: CPT | Mod: ,,, | Performed by: HOSPITALIST

## 2023-04-09 PROCEDURE — 86580 TB INTRADERMAL TEST: CPT | Performed by: INTERNAL MEDICINE

## 2023-04-09 PROCEDURE — 30200315 PPD INTRADERMAL TEST REV CODE 302: Performed by: INTERNAL MEDICINE

## 2023-04-09 PROCEDURE — 25000003 PHARM REV CODE 250

## 2023-04-09 PROCEDURE — 96361 HYDRATE IV INFUSION ADD-ON: CPT

## 2023-04-09 RX ORDER — HYDRALAZINE HYDROCHLORIDE 20 MG/ML
10 INJECTION INTRAMUSCULAR; INTRAVENOUS EVERY 6 HOURS PRN
Status: DISCONTINUED | OUTPATIENT
Start: 2023-04-09 | End: 2023-04-14 | Stop reason: HOSPADM

## 2023-04-09 RX ORDER — DONEPEZIL HYDROCHLORIDE 5 MG/1
10 TABLET, FILM COATED ORAL NIGHTLY
Status: DISCONTINUED | OUTPATIENT
Start: 2023-04-09 | End: 2023-04-14 | Stop reason: HOSPADM

## 2023-04-09 RX ORDER — GUAIFENESIN/DEXTROMETHORPHAN 100-10MG/5
10 SYRUP ORAL EVERY 6 HOURS PRN
Status: DISCONTINUED | OUTPATIENT
Start: 2023-04-09 | End: 2023-04-14 | Stop reason: HOSPADM

## 2023-04-09 RX ORDER — ACETAMINOPHEN 500 MG
1000 TABLET ORAL EVERY 6 HOURS PRN
Status: DISCONTINUED | OUTPATIENT
Start: 2023-04-09 | End: 2023-04-14 | Stop reason: HOSPADM

## 2023-04-09 RX ORDER — ENOXAPARIN SODIUM 100 MG/ML
40 INJECTION SUBCUTANEOUS EVERY 24 HOURS
Status: DISCONTINUED | OUTPATIENT
Start: 2023-04-09 | End: 2023-04-09

## 2023-04-09 RX ORDER — GLUCAGON 1 MG
1 KIT INJECTION
Status: DISCONTINUED | OUTPATIENT
Start: 2023-04-09 | End: 2023-04-14 | Stop reason: HOSPADM

## 2023-04-09 RX ORDER — SODIUM CHLORIDE 0.9 % (FLUSH) 0.9 %
10 SYRINGE (ML) INJECTION EVERY 12 HOURS PRN
Status: DISCONTINUED | OUTPATIENT
Start: 2023-04-09 | End: 2023-04-14 | Stop reason: HOSPADM

## 2023-04-09 RX ORDER — BISACODYL 5 MG
10 TABLET, DELAYED RELEASE (ENTERIC COATED) ORAL DAILY PRN
Status: DISCONTINUED | OUTPATIENT
Start: 2023-04-09 | End: 2023-04-14 | Stop reason: HOSPADM

## 2023-04-09 RX ORDER — GABAPENTIN 100 MG/1
100 CAPSULE ORAL 2 TIMES DAILY
Status: DISCONTINUED | OUTPATIENT
Start: 2023-04-09 | End: 2023-04-09

## 2023-04-09 RX ORDER — ESCITALOPRAM OXALATE 10 MG/1
20 TABLET ORAL DAILY
Status: DISCONTINUED | OUTPATIENT
Start: 2023-04-09 | End: 2023-04-14 | Stop reason: HOSPADM

## 2023-04-09 RX ORDER — DEXTROSE 40 %
15 GEL (GRAM) ORAL
Status: DISCONTINUED | OUTPATIENT
Start: 2023-04-09 | End: 2023-04-14 | Stop reason: HOSPADM

## 2023-04-09 RX ORDER — NALOXONE HCL 0.4 MG/ML
0.02 VIAL (ML) INJECTION
Status: DISCONTINUED | OUTPATIENT
Start: 2023-04-09 | End: 2023-04-14 | Stop reason: HOSPADM

## 2023-04-09 RX ORDER — SODIUM CHLORIDE, SODIUM LACTATE, POTASSIUM CHLORIDE, CALCIUM CHLORIDE 600; 310; 30; 20 MG/100ML; MG/100ML; MG/100ML; MG/100ML
INJECTION, SOLUTION INTRAVENOUS CONTINUOUS
Status: DISCONTINUED | OUTPATIENT
Start: 2023-04-09 | End: 2023-04-13

## 2023-04-09 RX ORDER — DONEPEZIL HYDROCHLORIDE 5 MG/1
5 TABLET, FILM COATED ORAL 2 TIMES DAILY
Status: DISCONTINUED | OUTPATIENT
Start: 2023-04-09 | End: 2023-04-09

## 2023-04-09 RX ORDER — TRAZODONE HYDROCHLORIDE 50 MG/1
50 TABLET ORAL NIGHTLY PRN
Status: DISCONTINUED | OUTPATIENT
Start: 2023-04-09 | End: 2023-04-14 | Stop reason: HOSPADM

## 2023-04-09 RX ORDER — MEMANTINE HYDROCHLORIDE 5 MG/1
5 TABLET ORAL 2 TIMES DAILY
Status: CANCELLED | OUTPATIENT
Start: 2023-04-09

## 2023-04-09 RX ORDER — DONEPEZIL HYDROCHLORIDE 5 MG/1
5 TABLET, FILM COATED ORAL NIGHTLY
Status: DISCONTINUED | OUTPATIENT
Start: 2023-04-09 | End: 2023-04-09

## 2023-04-09 RX ORDER — POLYETHYLENE GLYCOL 3350 17 G/17G
17 POWDER, FOR SOLUTION ORAL DAILY PRN
Status: DISCONTINUED | OUTPATIENT
Start: 2023-04-09 | End: 2023-04-14 | Stop reason: HOSPADM

## 2023-04-09 RX ORDER — ONDANSETRON 2 MG/ML
8 INJECTION INTRAMUSCULAR; INTRAVENOUS EVERY 6 HOURS PRN
Status: DISCONTINUED | OUTPATIENT
Start: 2023-04-09 | End: 2023-04-14 | Stop reason: HOSPADM

## 2023-04-09 RX ORDER — SODIUM CHLORIDE 9 MG/ML
INJECTION, SOLUTION INTRAVENOUS CONTINUOUS
Status: DISCONTINUED | OUTPATIENT
Start: 2023-04-09 | End: 2023-04-09

## 2023-04-09 RX ORDER — TALC
6 POWDER (GRAM) TOPICAL NIGHTLY PRN
Status: DISCONTINUED | OUTPATIENT
Start: 2023-04-09 | End: 2023-04-09

## 2023-04-09 RX ORDER — DEXTROSE 40 %
30 GEL (GRAM) ORAL
Status: DISCONTINUED | OUTPATIENT
Start: 2023-04-09 | End: 2023-04-14 | Stop reason: HOSPADM

## 2023-04-09 RX ORDER — ESCITALOPRAM OXALATE 10 MG/1
20 TABLET ORAL DAILY
Status: DISCONTINUED | OUTPATIENT
Start: 2023-04-09 | End: 2023-04-09

## 2023-04-09 RX ORDER — SIMETHICONE 80 MG
1 TABLET,CHEWABLE ORAL 3 TIMES DAILY PRN
Status: DISCONTINUED | OUTPATIENT
Start: 2023-04-09 | End: 2023-04-14 | Stop reason: HOSPADM

## 2023-04-09 RX ORDER — MEMANTINE HYDROCHLORIDE 7 MG/1
7 CAPSULE, EXTENDED RELEASE ORAL 3 TIMES DAILY
Status: DISCONTINUED | OUTPATIENT
Start: 2023-04-09 | End: 2023-04-09

## 2023-04-09 RX ORDER — MEMANTINE HYDROCHLORIDE 5 MG/1
5 TABLET ORAL 2 TIMES DAILY
Status: DISCONTINUED | OUTPATIENT
Start: 2023-04-09 | End: 2023-04-14 | Stop reason: HOSPADM

## 2023-04-09 RX ADMIN — MEMANTINE HYDROCHLORIDE 5 MG: 5 TABLET, FILM COATED ORAL at 08:04

## 2023-04-09 RX ADMIN — ESCITALOPRAM OXALATE 20 MG: 10 TABLET, FILM COATED ORAL at 08:04

## 2023-04-09 RX ADMIN — DONEPEZIL HYDROCHLORIDE 10 MG: 5 TABLET ORAL at 08:04

## 2023-04-09 RX ADMIN — SODIUM CHLORIDE: 9 INJECTION, SOLUTION INTRAVENOUS at 06:04

## 2023-04-09 RX ADMIN — SODIUM CHLORIDE, POTASSIUM CHLORIDE, SODIUM LACTATE AND CALCIUM CHLORIDE: 600; 310; 30; 20 INJECTION, SOLUTION INTRAVENOUS at 08:04

## 2023-04-09 RX ADMIN — TUBERCULIN PURIFIED PROTEIN DERIVATIVE 5 UNITS: 5 INJECTION, SOLUTION INTRADERMAL at 03:04

## 2023-04-09 NOTE — PROGRESS NOTES
[facility] - Emergency Department  Hospital Medicine  Progress Note    Patient Name: Acacia Mcgrath  MRN: [id number]  Patient Class: OP- Observation   Admission Date: 4/8/2023  Length of Stay: 0 days  Attending Physician: Daron Ross MD  Primary Care Provider: Molina Hartley MD        Subjective:     Principal Problem:Encephalopathy, metabolic        HPI:  Patient is a 89yo female who presents to the [facility] ED via EMS with AMS, decreased appetite, and general weakness. Patient's daughter in room provided all of the history. Patient has a history of Alzheimer's dementia and depression. In the past 3-4 weeks, patient has been having increased sleep (up to 24 hours), decreased energy, inability to walk, confusion, disorientation that have been progressively getting worse. Patient at baseline could communicate and walk by leaning on the wall but she hasn't been able to do so in the past few weeks. Patient needs a walker and cane to walk at baseline but refuses to use them.    In the past week, patient started having left arm pain and decreased motor strength. Patient is usually able to stand up with support of her arms on the chair but hasn't been able to do so in the past week. Patient has had similar episodes about 2 weeks ago that improved but got worse again. 9 days ago, patient had a home visit from her PCP Dr Gomez for similar symptoms and was told she had an UTI. She was treated with Macrobid for 5 days and had 1 day of antibiotic left. Patient lives at home with daughter. Daughter is getting concerned recently about taking care of patient because she couldn't pick her up when she couldn't stand on her own. She is interested in nursing home placement for the patient. Patient has urinary urgency and incontinence due to not being able to walk to the bathroom in time. Daughter denies shakes, seizures, falls, syncope, history of stroke, MI.    In the ED, vitals 135/59, 98.3, 73, 13, 96% on RA. Labs  unremarkable except for Cr 1.1, glucose 117, GFR 48. Lactate 1.4, COVID negative. UA negative for UTI. EKG paced rhythm 69. CT head, CXR , KUB pending. Patient is admitted to hospital medicine for observation and further management.      Overview/Hospital Course:  No notes on file    Interval History: Patient is hard of hearing and has a fair amount of dementia unable to answer questions appropriately. Patient is sitting comfortably in bed.     Review of Systems   Unable to perform ROS: Mental status change   Objective:     Vital Signs (Most Recent):  Temp: 98.3 °F (36.8 °C) (04/08/23 2202)  Pulse: 81 (04/09/23 0832)  Resp: 10 (04/09/23 0832)  BP: (!) 135/59 (04/09/23 0832)  SpO2: (!) 94 % (04/09/23 0832)   Vital Signs (24h Range):  Temp:  [98.3 °F (36.8 °C)] 98.3 °F (36.8 °C)  Pulse:  [60-81] 81  Resp:  [8-22] 10  SpO2:  [94 %-98 %] 94 %  BP: (108-162)/(46-70) 135/59     Weight: 68 kg (150 lb)  Body mass index is 24.21 kg/m².  No intake or output data in the 24 hours ending 04/09/23 1117   Physical Exam  Vitals and nursing note reviewed.   Constitutional:       General: She is not in acute distress.     Appearance: She is normal weight. She is not ill-appearing or diaphoretic.   HENT:      Head: Normocephalic.      Right Ear: External ear normal.      Left Ear: External ear normal.      Nose: Nose normal.      Mouth/Throat:      Pharynx: Oropharynx is clear.   Eyes:      General:         Right eye: No discharge.         Left eye: No discharge.      Conjunctiva/sclera: Conjunctivae normal.   Cardiovascular:      Rate and Rhythm: Normal rate and regular rhythm.      Pulses: Normal pulses.      Heart sounds: Normal heart sounds.   Pulmonary:      Effort: No respiratory distress.      Breath sounds: Normal breath sounds. No wheezing, rhonchi or rales.   Abdominal:      General: Bowel sounds are normal.      Tenderness: There is no guarding or rebound.   Musculoskeletal:         General: No deformity.      Cervical  back: Neck supple.      Right lower leg: No edema.      Left lower leg: No edema.   Skin:     General: Skin is warm.      Coloration: Skin is not jaundiced.   Neurological:      Mental Status: She is lethargic and disoriented.   Psychiatric:      Comments: Unable to perform       Significant Labs: All pertinent labs within the past 24 hours have been reviewed.    Significant Imaging: I have reviewed all pertinent imaging results/findings within the past 24 hours.      Assessment/Plan:      * Encephalopathy, metabolic  Etiology is unknown but the patient has just completed a 5-day course of Macrobid for UTI but repeated urinalysis is negative. Continue IV hydration at 100 mL/h and monitor. Pending PT/OT evaluation.     Negative CT head, chest Xray, and KUB      Chronic low back pain  Holding home gabapentin         Hypertension  Blood pressure is currently controlled  We will monitor for now      Senile dementia with depression  - Reduce home Donepezil from 5 mg BID to once daily and reduce home Memantine XR 7 mg TID to Immediate release BID while in the hospital  - Continue home Lexapro 20 mg daily  - Continue to monitor  - Social service consulted, thanks for the assistance        VTE Risk Mitigation (From admission, onward)         Ordered     IP VTE HIGH RISK PATIENT  Once         04/09/23 0327     Place sequential compression device  Until discontinued         04/09/23 0327                Discharge Planning   KATH:      Code Status: DNR   Is the patient medically ready for discharge?:     Reason for patient still in hospital (select all that apply): Treatment                     Walt Alexandre DO  Department of Hospital Medicine   [facility] - Emergency Department

## 2023-04-09 NOTE — HPI
Patient is a 89yo female who presents to the [facility] ED via EMS with AMS, decreased appetite, and general weakness. Patient's daughter in room provided all of the history. Patient has a history of Alzheimer's dementia and depression. In the past 3-4 weeks, patient has been having increased sleep (up to 24 hours), decreased energy, inability to walk, confusion, disorientation that have been progressively getting worse. Patient at baseline could communicate and walk by leaning on the wall but she hasn't been able to do so in the past few weeks. Patient needs a walker and cane to walk at baseline but refuses to use them.    In the past week, patient started having left arm pain and decreased motor strength. Patient is usually able to stand up with support of her arms on the chair but hasn't been able to do so in the past week. Patient has had similar episodes about 2 weeks ago that improved but got worse again. 9 days ago, patient had a home visit from her PCP Dr Gomez for similar symptoms and was told she had an UTI. She was treated with Macrobid for 5 days and had 1 day of antibiotic left. Patient lives at home with daughter. Daughter is getting concerned recently about taking care of patient because she couldn't pick her up when she couldn't stand on her own. She is interested in nursing home placement for the patient. Patient has urinary urgency and incontinence due to not being able to walk to the bathroom in time. Daughter denies shakes, seizures, falls, syncope, history of stroke, MI.    In the ED, vitals 135/59, 98.3, 73, 13, 96% on RA. Labs unremarkable except for Cr 1.1, glucose 117, GFR 48. Lactate 1.4, COVID negative. UA negative for UTI. EKG paced rhythm 69. CT head, CXR , KUB pending. Patient is admitted to hospital medicine for observation and further management.

## 2023-04-09 NOTE — ASSESSMENT & PLAN NOTE
Etiology is unknown but the patient has just completed a 5-day course of Macrobid for UTI but repeated urinalysis is negative. Continue IV hydration at 100 mL/h and monitor. Pending PT/OT evaluation.     Pending CT head, chest Xray, and KUB

## 2023-04-09 NOTE — PLAN OF CARE
0753-Attempted to call daughter (Fay). No answer rec'd.    1229-Attempted to call daughter for 2nd time. No answer rec'd. SS will follow up on Monday.

## 2023-04-09 NOTE — ED PROVIDER NOTES
Encounter Date: 4/8/2023    SCRIBE #1 NOTE: I, Iwona Villavicencio, am scribing for, and in the presence of,  Vinod Martin MD. I have scribed the entire note.     History     Chief Complaint   Patient presents with    Altered Mental Status     89 y.o. female presents to the ED with complaints of appetite deficiency and weakness. EMS stated the patient lives at home. No other symptoms were reported.     The history is provided by the EMS personnel. No  was used.   Review of patient's allergies indicates:  No Known Allergies  Past Medical History:   Diagnosis Date    Alzheimer disease     Anxiety     Depression     GERD (gastroesophageal reflux disease)     Hyperlipidemia     Hypertension     Low back pain     Vitamin D deficiency      Past Surgical History:   Procedure Laterality Date    BONE DENSITY  11/06/2018     Family History   Problem Relation Age of Onset    Alzheimer's disease Other     Cancer Other     Heart disease Other     Thyroid disease Other      Social History     Tobacco Use    Smoking status: Never    Smokeless tobacco: Never   Substance Use Topics    Alcohol use: Not Currently    Drug use: Never     Review of Systems   Constitutional:  Positive for appetite change (deficiency).   HENT: Negative.     Eyes: Negative.    Respiratory: Negative.     Gastrointestinal: Negative.    Endocrine: Negative.    Genitourinary: Negative.    Musculoskeletal: Negative.    Skin: Negative.    Allergic/Immunologic: Negative.    Neurological:  Positive for weakness.   Psychiatric/Behavioral: Negative.     All other systems reviewed and are negative.    Physical Exam     Initial Vitals   BP Pulse Resp Temp SpO2   04/08/23 2212 04/08/23 2205 04/08/23 2205 04/08/23 2202 04/08/23 2205   (!) 135/59 73 13 98.3 °F (36.8 °C) 96 %      MAP       --                Physical Exam    Vitals reviewed.  Constitutional: She appears well-developed and well-nourished. No distress.   HENT:   Head: Normocephalic.    Eyes: Conjunctivae are normal. Pupils are equal, round, and reactive to light.   Cardiovascular:  Normal rate, regular rhythm, normal heart sounds and intact distal pulses.           Pulmonary/Chest: Breath sounds normal.   Abdominal: Abdomen is soft. Bowel sounds are normal. There is abdominal tenderness (mildly, diffuse).     Neurological: She is alert and oriented to person, place, and time.   Skin: Skin is warm and dry.   Psychiatric: She has a normal mood and affect.       ED Course   Procedures  Labs Reviewed   COMPREHENSIVE METABOLIC PANEL - Abnormal; Notable for the following components:       Result Value    Glucose 117 (*)     Creatinine 1.10 (*)     Albumin 3.3 (*)     eGFR 48 (*)     All other components within normal limits   URINALYSIS, REFLEX TO URINE CULTURE - Abnormal; Notable for the following components:    Protein, UA 20 (*)     All other components within normal limits   CBC WITH DIFFERENTIAL - Abnormal; Notable for the following components:    RBC 4.14 (*)     MCHC 31.3 (*)     Neutrophils % 66.2 (*)     Lymphocytes % 23.4 (*)     Monocytes % 8.2 (*)     Immature Granulocytes % 0.5 (*)     All other components within normal limits   LACTIC ACID, PLASMA - Normal   CBC W/ AUTO DIFFERENTIAL    Narrative:     The following orders were created for panel order CBC auto differential.  Procedure                               Abnormality         Status                     ---------                               -----------         ------                     CBC with Differential[[id number]]        Abnormal            Final result                 Please view results for these tests on the individual orders.          Imaging Results              CT Head Without Contrast (In process)                      Medications - No data to display  Medical Decision Making:   Initial Assessment:   Altered mental status with history of dementia.    Differential Diagnosis:   Cva vs infection vs metabolic issue vs  worsening dementia  Clinical Tests:   Lab Tests: Ordered and Reviewed  Radiological Study: Ordered and Reviewed  Medical Tests: Ordered and Reviewed  ED Management:  Dx:  altered mental status          Attending Attestation:           Physician Attestation for Scribe:  Physician Attestation Statement for Scribe #1: I, Vinod Martin MD, reviewed documentation, as scribed by Iwona Villavicencio in my presence, and it is both accurate and complete.                        Clinical Impression:   Final diagnoses:  [R41.82] Altered mental status        ED Disposition Condition    Discharge Stable          ED Prescriptions    None       Follow-up Information       Follow up With Specialties Details Why Contact Info    Molina Hartley MD Family Medicine  As needed [address] [facility] [address]  [phone]               Vinod Martin MD  04/08/23 8145

## 2023-04-09 NOTE — H&P
[facility] - Emergency Department  Hospital Medicine  History & Physical    Patient Name: Acacia Mcgrath  MRN: [id number]  Patient Class: OP- Observation  Admission Date: 4/8/2023  Attending Physician: Daron Ross MD   Primary Care Provider: Molina Hartley MD         Patient information was obtained from patient, caregiver / friend, past medical records and ER records.     Subjective:     Principal Problem:Encephalopathy, metabolic    Chief Complaint:   Chief Complaint   Patient presents with    Altered Mental Status        HPI: Patient is a 89yo female who presents to the [facility] ED via EMS with AMS, decreased appetite, and general weakness. Patient's daughter in room provided all of the history. Patient has a history of Alzheimer's dementia and depression. In the past 3-4 weeks, patient has been having increased sleep (up to 24 hours), decreased energy, inability to walk, confusion, disorientation that have been progressively getting worse. Patient at baseline could communicate and walk by leaning on the wall but she hasn't been able to do so in the past few weeks. Patient needs a walker and cane to walk at baseline but refuses to use them.    In the past week, patient started having left arm pain and decreased motor strength. Patient is usually able to stand up with support of her arms on the chair but hasn't been able to do so in the past week. Patient has had similar episodes about 2 weeks ago that improved but got worse again. 9 days ago, patient had a home visit from her PCP Dr Gomez for similar symptoms and was told she had an UTI. She was treated with Macrobid for 5 days and had 1 day of antibiotic left. Patient lives at home with daughter. Daughter is getting concerned recently about taking care of patient because she couldn't pick her up when she couldn't stand on her own. She is interested in nursing home placement for the patient. Patient has urinary urgency and incontinence due to not being  able to walk to the bathroom in time. Daughter denies shakes, seizures, falls, syncope, history of stroke, MI.    In the ED, vitals 135/59, 98.3, 73, 13, 96% on RA. Labs unremarkable except for Cr 1.1, glucose 117, GFR 48. Lactate 1.4, COVID negative. UA negative for UTI. EKG paced rhythm 69. CT head, CXR , KUB pending. Patient is admitted to hospital medicine for observation and further management.      Past Medical History:   Diagnosis Date    Cancer     Chronic low back pain     GERD (gastroesophageal reflux disease)     Hyperlipidemia     Hypertension     Other specified types of non-hodgkin lymphoma, unspecified site     Senile dementia with depression     Vitamin D deficiency        Past Surgical History:   Procedure Laterality Date    BONE DENSITY  11/06/2018    INSERTION OF PACEMAKER      Dr. Vinayak Whitney       Review of patient's allergies indicates:  No Known Allergies    No current facility-administered medications on file prior to encounter.     Current Outpatient Medications on File Prior to Encounter   Medication Sig    donepeziL (ARICEPT) 5 MG tablet Take 1 tablet (5 mg total) by mouth 2 (two) times a day.    EScitalopram oxalate (LEXAPRO) 20 MG tablet Take 1 tablet (20 mg total) by mouth Daily.    gabapentin (NEURONTIN) 100 MG capsule Take 1 capsule (100 mg total) by mouth 3 (three) times daily.    memantine (NAMENDA XR) 7 mg CSpX Take 1 capsule (7 mg total) by mouth 3 (three) times daily.    nitrofurantoin, macrocrystal-monohydrate, (MACROBID) 100 MG capsule Take 1 capsule (100 mg total) by mouth 2 (two) times daily. for 5 days    diclofenac sodium (VOLTAREN) 1 % Gel Apply 2 g topically once daily.    ibandronate (BONIVA) 150 mg tablet Take 1 tablet (150 mg total) by mouth every 30 days.     Family History       Problem Relation (Age of Onset)    Alzheimer's disease Other    Cancer Other    Heart disease Other    Thyroid disease Other          Tobacco Use    Smoking status:  Never    Smokeless tobacco: Never   Substance and Sexual Activity    Alcohol use: Not Currently    Drug use: Never    Sexual activity: Not Currently     Review of Systems   Unable to perform ROS: Mental status change   Objective:     Vital Signs (Most Recent):  Temp: 98.3 °F (36.8 °C) (04/08/23 2202)  Pulse: 61 (04/09/23 0202)  Resp: 13 (04/09/23 0202)  BP: (!) 145/61 (04/09/23 0202)  SpO2: 97 % (04/09/23 0202)   Vital Signs (24h Range):  Temp:  [98.3 °F (36.8 °C)] 98.3 °F (36.8 °C)  Pulse:  [60-75] 61  Resp:  [8-16] 13  SpO2:  [94 %-97 %] 97 %  BP: (135-162)/(57-70) 145/61     Weight: 68 kg (150 lb)  Body mass index is 24.21 kg/m².    Physical Exam  Vitals and nursing note reviewed.   Constitutional:       General: She is not in acute distress.     Appearance: She is normal weight. She is not ill-appearing or diaphoretic.   HENT:      Head: Normocephalic.      Right Ear: External ear normal.      Left Ear: External ear normal.      Nose: Nose normal.      Mouth/Throat:      Pharynx: Oropharynx is clear.   Eyes:      General:         Right eye: No discharge.         Left eye: No discharge.      Conjunctiva/sclera: Conjunctivae normal.   Cardiovascular:      Rate and Rhythm: Normal rate and regular rhythm.      Pulses: Normal pulses.      Heart sounds: Normal heart sounds.   Pulmonary:      Effort: No respiratory distress.      Breath sounds: Normal breath sounds. No wheezing, rhonchi or rales.   Abdominal:      General: Bowel sounds are normal.      Tenderness: There is no guarding or rebound.   Musculoskeletal:         General: No deformity.      Cervical back: Neck supple.      Right lower leg: No edema.      Left lower leg: No edema.   Skin:     General: Skin is warm.      Coloration: Skin is not jaundiced.   Neurological:      Mental Status: She is lethargic and disoriented.   Psychiatric:      Comments: Unable to perform           Significant Labs: All pertinent labs within the past 24 hours have been  reviewed.  Recent Lab Results         04/09/23  0121   04/08/23  2233   04/08/23  2223        Albumin/Globulin Ratio   1.0         Albumin   3.3         Alkaline Phosphatase   82         ALT   23         Anion Gap   12         Appearance, UA     Clear       AST   17         Baso #   0.06         Basophil %   0.7         Bilirubin (UA)     Negative       BILIRUBIN TOTAL   0.3         BUN   16         BUN/CREAT RATIO   15         Calcium   9.5         Chloride   102         CO2   30         Color, UA     Yellow       ID NOW COVID-19, (DAJUAN) Negative           Creatinine   1.10         Differential Type   Auto         eGFR   48         Eos #   0.09         Eosinophil %   1.0         Globulin, Total   3.4         Glucose   117         Glucose, UA     Normal       Hematocrit   38.6         Hemoglobin   12.1         Immature Grans (Abs)   0.04         Immature Granulocytes   0.5         Ketones, UA     Negative       Lactate, Doug   1.4         Leukocytes, UA     Negative       Lymph #   2.02         Lymph %   23.4         MCH   29.2         MCHC   31.3         MCV   93.2         Mono #   0.71         Mono %   8.2         MPV   10.7         Neutrophils, Abs   5.72         Neutrophils Relative   66.2         NITRITE UA     Negative       nRBC   0.0         NUCLEATED RBC ABSOLUTE   0.00         Occult Blood UA     Negative       pH, UA     5.5       Platelets   204         Potassium   4.1         PROTEIN TOTAL   6.7         Protein, UA     20       RBC   4.14         RDW   14.3         Sodium   140         Specific [place], UA     1.025       UROBILINOGEN UA     Normal       WBC   8.64                 Significant Imaging: I have reviewed all pertinent imaging results/findings within the past 24 hours.  I have reviewed and interpreted all pertinent imaging results/findings within the past 24 hours.    Scheduled Meds:  Continuous Infusions:  PRN Meds:.acetaminophen, bisacodyL, dextromethorphan-guaiFENesin  mg/5 ml,  ondansetron, simethicone, traZODone     Assessment/Plan:     * Encephalopathy, metabolic  Etiology is unknown but the patient has just completed a 5-day course of Macrobid for UTI but repeated urinalysis is negative. Continue IV hydration at 100 mL/h and monitor. Pending PT/OT evaluation.     Pending CT head, chest Xray, and KUB      Hypertension  Blood pressure is mild elevated but will continue to monitor  Hydralazine PRN with parameters      Senile dementia with depression  - Reduce home Donepezil from 5 mg BID to once daily and reduce home Memantine XR 7 mg TID to Immediate release BID while in the hospital  - Continue home Lexapro 20 mg daily  - Continue to monitor  - Social service consulted, thanks for the assistance      Chronic low back pain  Reduce  home gabapentin from 100 mg TID to 100 mg BID           VTE Risk Mitigation (From admission, onward)         Ordered     IP VTE HIGH RISK PATIENT  Once         04/09/23 0327     Place sequential compression device  Until discontinued         04/09/23 0327                       On 04/09/2023, patient should be placed in hospital observation services under my care in collaboration with Dr. Asya Tovar.    Lila Khan DO  Department of Hospital Medicine  [facility] - Emergency Department

## 2023-04-09 NOTE — ASSESSMENT & PLAN NOTE
Etiology is unknown but the patient has just completed a 5-day course of Macrobid for UTI but repeated urinalysis is negative. Continue IV hydration at 100 mL/h and monitor. Pending PT/OT evaluation.     Negative CT head, chest Xray, and KUB

## 2023-04-09 NOTE — SUBJECTIVE & OBJECTIVE
Interval History: Patient is hard of hearing and has a fair amount of dementia unable to answer questions appropriately. Patient is sitting comfortably in bed.     Review of Systems   Unable to perform ROS: Mental status change   Objective:     Vital Signs (Most Recent):  Temp: 98.3 °F (36.8 °C) (04/08/23 2202)  Pulse: 81 (04/09/23 0832)  Resp: 10 (04/09/23 0832)  BP: (!) 135/59 (04/09/23 0832)  SpO2: (!) 94 % (04/09/23 0832)   Vital Signs (24h Range):  Temp:  [98.3 °F (36.8 °C)] 98.3 °F (36.8 °C)  Pulse:  [60-81] 81  Resp:  [8-22] 10  SpO2:  [94 %-98 %] 94 %  BP: (108-162)/(46-70) 135/59     Weight: 68 kg (150 lb)  Body mass index is 24.21 kg/m².  No intake or output data in the 24 hours ending 04/09/23 1117   Physical Exam  Vitals and nursing note reviewed.   Constitutional:       General: She is not in acute distress.     Appearance: She is normal weight. She is not ill-appearing or diaphoretic.   HENT:      Head: Normocephalic.      Right Ear: External ear normal.      Left Ear: External ear normal.      Nose: Nose normal.      Mouth/Throat:      Pharynx: Oropharynx is clear.   Eyes:      General:         Right eye: No discharge.         Left eye: No discharge.      Conjunctiva/sclera: Conjunctivae normal.   Cardiovascular:      Rate and Rhythm: Normal rate and regular rhythm.      Pulses: Normal pulses.      Heart sounds: Normal heart sounds.   Pulmonary:      Effort: No respiratory distress.      Breath sounds: Normal breath sounds. No wheezing, rhonchi or rales.   Abdominal:      General: Bowel sounds are normal.      Tenderness: There is no guarding or rebound.   Musculoskeletal:         General: No deformity.      Cervical back: Neck supple.      Right lower leg: No edema.      Left lower leg: No edema.   Skin:     General: Skin is warm.      Coloration: Skin is not jaundiced.   Neurological:      Mental Status: She is lethargic and disoriented.   Psychiatric:      Comments: Unable to perform        Significant Labs: All pertinent labs within the past 24 hours have been reviewed.    Significant Imaging: I have reviewed all pertinent imaging results/findings within the past 24 hours.

## 2023-04-09 NOTE — SUBJECTIVE & OBJECTIVE
Past Medical History:   Diagnosis Date    Cancer     Chronic low back pain     GERD (gastroesophageal reflux disease)     Hyperlipidemia     Hypertension     Other specified types of non-hodgkin lymphoma, unspecified site     Senile dementia with depression     Vitamin D deficiency        Past Surgical History:   Procedure Laterality Date    BONE DENSITY  11/06/2018    INSERTION OF PACEMAKER      Dr. Vinayak Whitney       Review of patient's allergies indicates:  No Known Allergies    No current facility-administered medications on file prior to encounter.     Current Outpatient Medications on File Prior to Encounter   Medication Sig    donepeziL (ARICEPT) 5 MG tablet Take 1 tablet (5 mg total) by mouth 2 (two) times a day.    EScitalopram oxalate (LEXAPRO) 20 MG tablet Take 1 tablet (20 mg total) by mouth Daily.    gabapentin (NEURONTIN) 100 MG capsule Take 1 capsule (100 mg total) by mouth 3 (three) times daily.    memantine (NAMENDA XR) 7 mg CSpX Take 1 capsule (7 mg total) by mouth 3 (three) times daily.    nitrofurantoin, macrocrystal-monohydrate, (MACROBID) 100 MG capsule Take 1 capsule (100 mg total) by mouth 2 (two) times daily. for 5 days    diclofenac sodium (VOLTAREN) 1 % Gel Apply 2 g topically once daily.    ibandronate (BONIVA) 150 mg tablet Take 1 tablet (150 mg total) by mouth every 30 days.     Family History       Problem Relation (Age of Onset)    Alzheimer's disease Other    Cancer Other    Heart disease Other    Thyroid disease Other          Tobacco Use    Smoking status: Never    Smokeless tobacco: Never   Substance and Sexual Activity    Alcohol use: Not Currently    Drug use: Never    Sexual activity: Not Currently     Review of Systems   Unable to perform ROS: Mental status change   Objective:     Vital Signs (Most Recent):  Temp: 98.3 °F (36.8 °C) (04/08/23 2202)  Pulse: 61 (04/09/23 0202)  Resp: 13 (04/09/23 0202)  BP: (!) 145/61 (04/09/23 0202)  SpO2: 97 % (04/09/23 0202)   Vital Signs  (24h Range):  Temp:  [98.3 °F (36.8 °C)] 98.3 °F (36.8 °C)  Pulse:  [60-75] 61  Resp:  [8-16] 13  SpO2:  [94 %-97 %] 97 %  BP: (135-162)/(57-70) 145/61     Weight: 68 kg (150 lb)  Body mass index is 24.21 kg/m².    Physical Exam  Vitals and nursing note reviewed.   Constitutional:       General: She is not in acute distress.     Appearance: She is normal weight. She is not ill-appearing or diaphoretic.   HENT:      Head: Normocephalic.      Right Ear: External ear normal.      Left Ear: External ear normal.      Nose: Nose normal.      Mouth/Throat:      Pharynx: Oropharynx is clear.   Eyes:      General:         Right eye: No discharge.         Left eye: No discharge.      Conjunctiva/sclera: Conjunctivae normal.   Cardiovascular:      Rate and Rhythm: Normal rate and regular rhythm.      Pulses: Normal pulses.      Heart sounds: Normal heart sounds.   Pulmonary:      Effort: No respiratory distress.      Breath sounds: Normal breath sounds. No wheezing, rhonchi or rales.   Abdominal:      General: Bowel sounds are normal.      Tenderness: There is no guarding or rebound.   Musculoskeletal:         General: No deformity.      Cervical back: Neck supple.      Right lower leg: No edema.      Left lower leg: No edema.   Skin:     General: Skin is warm.      Coloration: Skin is not jaundiced.   Neurological:      Mental Status: She is lethargic and disoriented.   Psychiatric:      Comments: Unable to perform           Significant Labs: All pertinent labs within the past 24 hours have been reviewed.  Recent Lab Results         04/09/23  0121   04/08/23  2233   04/08/23  2223        Albumin/Globulin Ratio   1.0         Albumin   3.3         Alkaline Phosphatase   82         ALT   23         Anion Gap   12         Appearance, UA     Clear       AST   17         Baso #   0.06         Basophil %   0.7         Bilirubin (UA)     Negative       BILIRUBIN TOTAL   0.3         BUN   16         BUN/CREAT RATIO   15          Calcium   9.5         Chloride   102         CO2   30         Color, UA     Yellow       ID NOW COVID-19, (DAJUAN) Negative           Creatinine   1.10         Differential Type   Auto         eGFR   48         Eos #   0.09         Eosinophil %   1.0         Globulin, Total   3.4         Glucose   117         Glucose, UA     Normal       Hematocrit   38.6         Hemoglobin   12.1         Immature Grans (Abs)   0.04         Immature Granulocytes   0.5         Ketones, UA     Negative       Lactate, Doug   1.4         Leukocytes, UA     Negative       Lymph #   2.02         Lymph %   23.4         MCH   29.2         MCHC   31.3         MCV   93.2         Mono #   0.71         Mono %   8.2         MPV   10.7         Neutrophils, Abs   5.72         Neutrophils Relative   66.2         NITRITE UA     Negative       nRBC   0.0         NUCLEATED RBC ABSOLUTE   0.00         Occult Blood UA     Negative       pH, UA     5.5       Platelets   204         Potassium   4.1         PROTEIN TOTAL   6.7         Protein, UA     20       RBC   4.14         RDW   14.3         Sodium   140         Specific [place], UA     1.025       UROBILINOGEN UA     Normal       WBC   8.64                 Significant Imaging: I have reviewed all pertinent imaging results/findings within the past 24 hours.  I have reviewed and interpreted all pertinent imaging results/findings within the past 24 hours.    Scheduled Meds:  Continuous Infusions:  PRN Meds:.acetaminophen, bisacodyL, dextromethorphan-guaiFENesin  mg/5 ml, ondansetron, simethicone, traZODone

## 2023-04-09 NOTE — DISCHARGE INSTRUCTIONS
FOLLOW UP WITH YOUR PRIMARY CARE PROVIDER.  RETURN TO THE EMERGENCY DEPARTMENT AS NEEDED.  Hospitalist Discharge orders  *Notify your healthcare provider if you experience any of the following: temperature >100.4  * Notify your healthcare provider if you experience any of the following: redness, tenderness.    *Notify your healthcare provider if you experience any of the following: difficulty breathing or     increased cough.  *Notify your physician if you experience any persistent nausea, vomiting, or diarrhea or headache  *Notify your physician if you experience any of the following:severe uncontrolled pain;worsening rash, increased confusion or weakness; dizziness, lightheadedness or visual disturbances

## 2023-04-09 NOTE — ASSESSMENT & PLAN NOTE
- Reduce home Donepezil from 5 mg BID to once daily and reduce home Memantine XR 7 mg TID to Immediate release BID while in the hospital  - Continue home Lexapro 20 mg daily  - Continue to monitor  - Social service consulted, thanks for the assistance

## 2023-04-10 LAB
ANION GAP SERPL CALCULATED.3IONS-SCNC: 10 MMOL/L (ref 7–16)
BASOPHILS # BLD AUTO: 0.08 K/UL (ref 0–0.2)
BASOPHILS NFR BLD AUTO: 0.9 % (ref 0–1)
BUN SERPL-MCNC: 11 MG/DL (ref 7–18)
BUN/CREAT SERPL: 12 (ref 6–20)
CALCIUM SERPL-MCNC: 8.6 MG/DL (ref 8.5–10.1)
CHLORIDE SERPL-SCNC: 103 MMOL/L (ref 98–107)
CO2 SERPL-SCNC: 30 MMOL/L (ref 21–32)
CREAT SERPL-MCNC: 0.91 MG/DL (ref 0.55–1.02)
DIFFERENTIAL METHOD BLD: ABNORMAL
EGFR (NO RACE VARIABLE) (RUSH/TITUS): 60 ML/MIN/1.73M²
EOSINOPHIL # BLD AUTO: 0.09 K/UL (ref 0–0.5)
EOSINOPHIL NFR BLD AUTO: 1 % (ref 1–4)
ERYTHROCYTE [DISTWIDTH] IN BLOOD BY AUTOMATED COUNT: 14.2 % (ref 11.5–14.5)
GLUCOSE SERPL-MCNC: 89 MG/DL (ref 74–106)
HCT VFR BLD AUTO: 36.2 % (ref 38–47)
HGB BLD-MCNC: 11.4 G/DL (ref 12–16)
IMM GRANULOCYTES # BLD AUTO: 0.02 K/UL (ref 0–0.04)
IMM GRANULOCYTES NFR BLD: 0.2 % (ref 0–0.4)
LYMPHOCYTES # BLD AUTO: 2.32 K/UL (ref 1–4.8)
LYMPHOCYTES NFR BLD AUTO: 26.7 % (ref 27–41)
MCH RBC QN AUTO: 29.2 PG (ref 27–31)
MCHC RBC AUTO-ENTMCNC: 31.5 G/DL (ref 32–36)
MCV RBC AUTO: 92.6 FL (ref 80–96)
MONOCYTES # BLD AUTO: 0.7 K/UL (ref 0–0.8)
MONOCYTES NFR BLD AUTO: 8 % (ref 2–6)
MPC BLD CALC-MCNC: 10.8 FL (ref 9.4–12.4)
NEUTROPHILS # BLD AUTO: 5.49 K/UL (ref 1.8–7.7)
NEUTROPHILS NFR BLD AUTO: 63.2 % (ref 53–65)
NRBC # BLD AUTO: 0 X10E3/UL
NRBC, AUTO (.00): 0 %
PLATELET # BLD AUTO: 203 K/UL (ref 150–400)
POTASSIUM SERPL-SCNC: 4 MMOL/L (ref 3.5–5.1)
RBC # BLD AUTO: 3.91 M/UL (ref 4.2–5.4)
SODIUM SERPL-SCNC: 139 MMOL/L (ref 136–145)
WBC # BLD AUTO: 8.7 K/UL (ref 4.5–11)

## 2023-04-10 PROCEDURE — 99232 PR SUBSEQUENT HOSPITAL CARE,LEVL II: ICD-10-PCS | Mod: ,,, | Performed by: INTERNAL MEDICINE

## 2023-04-10 PROCEDURE — 25000003 PHARM REV CODE 250: Performed by: HOSPITALIST

## 2023-04-10 PROCEDURE — 99232 SBSQ HOSP IP/OBS MODERATE 35: CPT | Mod: ,,, | Performed by: INTERNAL MEDICINE

## 2023-04-10 PROCEDURE — 96361 HYDRATE IV INFUSION ADD-ON: CPT

## 2023-04-10 PROCEDURE — 63600175 PHARM REV CODE 636 W HCPCS

## 2023-04-10 PROCEDURE — 80048 BASIC METABOLIC PNL TOTAL CA: CPT

## 2023-04-10 PROCEDURE — 25000003 PHARM REV CODE 250

## 2023-04-10 PROCEDURE — G0378 HOSPITAL OBSERVATION PER HR: HCPCS | Mod: CS

## 2023-04-10 PROCEDURE — G0378 HOSPITAL OBSERVATION PER HR: HCPCS

## 2023-04-10 PROCEDURE — 96360 HYDRATION IV INFUSION INIT: CPT | Mod: 59

## 2023-04-10 PROCEDURE — 85025 COMPLETE CBC W/AUTO DIFF WBC: CPT

## 2023-04-10 RX ORDER — QUETIAPINE FUMARATE 25 MG/1
50 TABLET, FILM COATED ORAL NIGHTLY
Status: DISCONTINUED | OUTPATIENT
Start: 2023-04-10 | End: 2023-04-11

## 2023-04-10 RX ADMIN — MEMANTINE HYDROCHLORIDE 5 MG: 5 TABLET, FILM COATED ORAL at 09:04

## 2023-04-10 RX ADMIN — MEMANTINE HYDROCHLORIDE 5 MG: 5 TABLET, FILM COATED ORAL at 08:04

## 2023-04-10 RX ADMIN — SODIUM CHLORIDE, POTASSIUM CHLORIDE, SODIUM LACTATE AND CALCIUM CHLORIDE: 600; 310; 30; 20 INJECTION, SOLUTION INTRAVENOUS at 09:04

## 2023-04-10 RX ADMIN — QUETIAPINE FUMARATE 50 MG: 25 TABLET ORAL at 09:04

## 2023-04-10 RX ADMIN — ESCITALOPRAM OXALATE 20 MG: 10 TABLET, FILM COATED ORAL at 09:04

## 2023-04-10 RX ADMIN — TRAZODONE HYDROCHLORIDE 50 MG: 50 TABLET ORAL at 11:04

## 2023-04-10 RX ADMIN — DONEPEZIL HYDROCHLORIDE 10 MG: 5 TABLET ORAL at 08:04

## 2023-04-10 NOTE — PROGRESS NOTES
[facility] - Emergency Department  Hospital Medicine  Progress Note    Patient Name: Acacia Mcgrath  MRN: [id number]  Patient Class: OP- Observation   Admission Date: 4/8/2023  Length of Stay: 0 days  Attending Physician: Daron Ross MD  Primary Care Provider: Molina Hartley MD        Subjective:     Principal Problem:Encephalopathy, metabolic        HPI:  Patient is a 89yo female who presents to the [facility] ED via EMS with AMS, decreased appetite, and general weakness. Patient's daughter in room provided all of the history. Patient has a history of Alzheimer's dementia and depression. In the past 3-4 weeks, patient has been having increased sleep (up to 24 hours), decreased energy, inability to walk, confusion, disorientation that have been progressively getting worse. Patient at baseline could communicate and walk by leaning on the wall but she hasn't been able to do so in the past few weeks. Patient needs a walker and cane to walk at baseline but refuses to use them.    In the past week, patient started having left arm pain and decreased motor strength. Patient is usually able to stand up with support of her arms on the chair but hasn't been able to do so in the past week. Patient has had similar episodes about 2 weeks ago that improved but got worse again. 9 days ago, patient had a home visit from her PCP Dr Gomez for similar symptoms and was told she had an UTI. She was treated with Macrobid for 5 days and had 1 day of antibiotic left. Patient lives at home with daughter. Daughter is getting concerned recently about taking care of patient because she couldn't pick her up when she couldn't stand on her own. She is interested in nursing home placement for the patient. Patient has urinary urgency and incontinence due to not being able to walk to the bathroom in time. Daughter denies shakes, seizures, falls, syncope, history of stroke, MI.    In the ED, vitals 135/59, 98.3, 73, 13, 96% on RA. Labs  unremarkable except for Cr 1.1, glucose 117, GFR 48. Lactate 1.4, COVID negative. UA negative for UTI. EKG paced rhythm 69. CT head, CXR , KUB pending. Patient is admitted to hospital medicine for observation and further management.      Overview/Hospital Course:  No notes on file    Interval History: Patient appears more alert today than she did yesterday. Daughter is present in the room and states she looks more improved than admission.     Review of Systems   Unable to perform ROS: Mental status change   Objective:     Vital Signs (Most Recent):  Temp: 98.2 °F (36.8 °C) (04/09/23 1432)  Pulse: 86 (04/10/23 0757)  Resp: (!) 29 (04/10/23 0642)  BP: (!) 105/45 (04/10/23 0757)  SpO2: 95 % (04/10/23 0757)   Vital Signs (24h Range):  Temp:  [98.2 °F (36.8 °C)] 98.2 °F (36.8 °C)  Pulse:  [63-86] 86  Resp:  [8-29] 29  SpO2:  [92 %-100 %] 95 %  BP: ()/(43-61) 105/45     Weight: 68 kg (150 lb)  Body mass index is 24.21 kg/m².  No intake or output data in the 24 hours ending 04/10/23 1050   Physical Exam  Vitals and nursing note reviewed.   Constitutional:       General: She is not in acute distress.     Appearance: She is normal weight. She is not ill-appearing or diaphoretic.   HENT:      Head: Normocephalic.      Right Ear: External ear normal.      Left Ear: External ear normal.      Nose: Nose normal.      Mouth/Throat:      Pharynx: Oropharynx is clear.   Eyes:      General:         Right eye: No discharge.         Left eye: No discharge.      Conjunctiva/sclera: Conjunctivae normal.   Cardiovascular:      Rate and Rhythm: Normal rate and regular rhythm.      Pulses: Normal pulses.      Heart sounds: Normal heart sounds.   Pulmonary:      Effort: No respiratory distress.      Breath sounds: Normal breath sounds. No wheezing, rhonchi or rales.   Abdominal:      General: Bowel sounds are normal.      Tenderness: There is no guarding or rebound.   Musculoskeletal:         General: No deformity.      Right lower  leg: No edema.      Left lower leg: No edema.   Skin:     General: Skin is warm.      Coloration: Skin is not jaundiced.   Neurological:      Mental Status: She is lethargic and disoriented.   Psychiatric:      Comments: Unable to perform       Significant Labs: All pertinent labs within the past 24 hours have been reviewed.    Significant Imaging: I have reviewed all pertinent imaging results/findings within the past 24 hours.      Assessment/Plan:      * Encephalopathy, metabolic  Etiology is unknown but the patient has just completed a 5-day course of Macrobid for UTI but repeated urinalysis is negative. Continue IV hydration at 100 mL/h and monitor. Pending PT/OT evaluation.     Negative CT head, chest Xray, and KUB      Chronic low back pain  Holding home gabapentin         Hypertension  Blood pressure is currently controlled  We will monitor for now      Senile dementia with depression  - Reduce home Donepezil from 5 mg BID to once daily and reduce home Memantine XR 7 mg TID to Immediate release BID while in the hospital  - Continue home Lexapro 20 mg daily  - Continue to monitor  - Social service consulted, thanks for the assistance        VTE Risk Mitigation (From admission, onward)         Ordered     IP VTE HIGH RISK PATIENT  Once         04/09/23 0327     Place sequential compression device  Until discontinued         04/09/23 0327                Discharge Planning   KATH:      Code Status: DNR   Is the patient medically ready for discharge?:     Reason for patient still in hospital (select all that apply): Treatment                     Walt Alexandre DO  Department of Hospital Medicine   [facility] - Emergency Department

## 2023-04-10 NOTE — PLAN OF CARE
[facility] - Emergency Department  Initial Discharge Assessment       Primary Care Provider: Molina Hartley MD    Admission Diagnosis: Alzheimer disease [G30.9, F02.80]    Admission Date: 4/8/2023  Expected Discharge Date:     Discharge Barriers Identified: None    Payor: MEDICARE / Plan: MEDICARE PART A & B / Product Type: Government /     Extended Emergency Contact Information  Primary Emergency Contact: Fay Mcgrath  Address: [address] [place] [address]           [place] [address] [facility]  Home Phone: [phone]  Mobile Phone: [phone]  Relation: Daughter  Preferred language: English   needed? No  Secondary Emergency Contact: ClaridgeBenita  Mobile Phone: [phone]  Relation: Daughter  Preferred language: English   needed? No    Discharge Plan A: New Nursing Home placement - skilled nursing care facility, Assisted Living  Discharge Plan B: New Nursing Home placement - skilled nursing care facility      [facility] - [address] [address]  Phone: [phone] Fax: [phone]    [id number]  [place] - [address] AT [facility] [address]  & [facility]  [address]  [facility] [address]  Phone: [phone] Fax: [phone]      Initial Assessment (most recent)       Adult Discharge Assessment - 04/10/23 1414          Discharge Assessment    Assessment Type Discharge Planning Assessment     Source of Information family     People in Home child(shawnee), adult     Do you expect to return to your current living situation? No     Do you have help at home or someone to help you manage your care at home? Yes     Who are your caregiver(s) and their phone number(s)? Ceci Mcgrath- Daughter [phone]     Prior to hospitilization cognitive status: Unable to Assess     Current cognitive status: Unable to Assess     Equipment Currently Used at Home walker,  rolling;cane, straight     Readmission within 30 days? No     Patient currently being followed by outpatient case management? No     Do you currently have service(s) that help you manage your care at home? No     Do you take prescription medications? Yes     Do you have prescription coverage? Yes     Coverage Medicare     Do you have any problems affording any of your prescribed medications? No     Is the patient taking medications as prescribed? yes     Who is going to help you get home at discharge? Daughter     How do you get to doctors appointments? family or friend will provide     Are you on dialysis? No     Do you take coumadin? No     Discharge Plan A New Nursing Home placement - [facility];Assisted Living     Discharge Plan B New Nursing Home placement - [facility] care facility     DME Needed Upon Discharge  none     Discharge Plan discussed with: Adult children     Discharge Barriers Identified None        Physical Activity    On average, how many days per week do you engage in moderate to strenuous exercise (like a brisk walk)? 0 days     On average, how many minutes do you engage in exercise at this level? 0 min        Financial Resource Strain    How hard is it for you to pay for the very basics like food, housing, medical care, and heating? Not hard at all        Housing Stability    In the last 12 months, was there a time when you were not able to pay the mortgage or rent on time? No     In the last 12 months, was there a time when you did not have a steady place to sleep or slept in a shelter (including now)? No        Transportation Needs    In the past 12 months, has lack of transportation kept you from medical appointments or from getting medications? No     In the past 12 months, has lack of transportation kept you from meetings, work, or from getting things needed for daily living? No        Food Insecurity    Within the past 12 months, you worried that your food would run out  before you got the money to buy more. Never true     Within the past 12 months, the food you bought just didn't last and you didn't have money to get more. Never true        Stress    Do you feel stress - tense, restless, nervous, or anxious, or unable to sleep at night because your mind is troubled all the time - these days? Not at all        Social Connections    In a typical week, how many times do you talk on the phone with family, friends, or neighbors? More than three times a week     How often do you get together with friends or relatives? More than three times a week     How often do you attend Protestant or Tenriism services? Never   Pt used to attend Protestant unitl not able to ambulate    How often do you attend meetings of the clubs or organizations you belong to? Never     Are you , , , , never , or living with a partner?         Alcohol Use    Q1: How often do you have a drink containing alcohol? Never     Q2: How many drinks containing alcohol do you have on a typical day when you are drinking? Patient does not drink     Q3: How often do you have six or more drinks on one occasion? Never                   SERGE consulted for NH placement. SERGE spoke with pts daughter, Ceci. Pt lives at home with daughter, not current with hh and has a rw and cane but pt does not use it. Per pts daughter, she wants to try the [facility] Unit at [facility]. SERGE called and spoke with Graciela, Admissions Liaison at [facility] [phone] and she will call pts daughter and speak with her re: pt. Pts daughter gave choice for [facility] , [facility], [facility] and [facility]. Referrals faxed. [facility] question completed. SW will cont to follow for dc needs.

## 2023-04-10 NOTE — SUBJECTIVE & OBJECTIVE
Interval History: Patient appears more alert today than she did yesterday. Daughter is present in the room and states she looks more improved than admission.     Review of Systems   Unable to perform ROS: Mental status change   Objective:     Vital Signs (Most Recent):  Temp: 98.2 °F (36.8 °C) (04/09/23 1432)  Pulse: 86 (04/10/23 0757)  Resp: (!) 29 (04/10/23 0642)  BP: (!) 105/45 (04/10/23 0757)  SpO2: 95 % (04/10/23 0757)   Vital Signs (24h Range):  Temp:  [98.2 °F (36.8 °C)] 98.2 °F (36.8 °C)  Pulse:  [63-86] 86  Resp:  [8-29] 29  SpO2:  [92 %-100 %] 95 %  BP: ()/(43-61) 105/45     Weight: 68 kg (150 lb)  Body mass index is 24.21 kg/m².  No intake or output data in the 24 hours ending 04/10/23 1050   Physical Exam  Vitals and nursing note reviewed.   Constitutional:       General: She is not in acute distress.     Appearance: She is normal weight. She is not ill-appearing or diaphoretic.   HENT:      Head: Normocephalic.      Right Ear: External ear normal.      Left Ear: External ear normal.      Nose: Nose normal.      Mouth/Throat:      Pharynx: Oropharynx is clear.   Eyes:      General:         Right eye: No discharge.         Left eye: No discharge.      Conjunctiva/sclera: Conjunctivae normal.   Cardiovascular:      Rate and Rhythm: Normal rate and regular rhythm.      Pulses: Normal pulses.      Heart sounds: Normal heart sounds.   Pulmonary:      Effort: No respiratory distress.      Breath sounds: Normal breath sounds. No wheezing, rhonchi or rales.   Abdominal:      General: Bowel sounds are normal.      Tenderness: There is no guarding or rebound.   Musculoskeletal:         General: No deformity.      Right lower leg: No edema.      Left lower leg: No edema.   Skin:     General: Skin is warm.      Coloration: Skin is not jaundiced.   Neurological:      Mental Status: She is lethargic and disoriented.   Psychiatric:      Comments: Unable to perform       Significant Labs: All pertinent labs within  the past 24 hours have been reviewed.    Significant Imaging: I have reviewed all pertinent imaging results/findings within the past 24 hours.

## 2023-04-11 LAB
ANION GAP SERPL CALCULATED.3IONS-SCNC: 10 MMOL/L (ref 7–16)
BASOPHILS # BLD AUTO: 0.06 K/UL (ref 0–0.2)
BASOPHILS NFR BLD AUTO: 0.8 % (ref 0–1)
BUN SERPL-MCNC: 10 MG/DL (ref 7–18)
BUN/CREAT SERPL: 13 (ref 6–20)
CALCIUM SERPL-MCNC: 9.1 MG/DL (ref 8.5–10.1)
CHLORIDE SERPL-SCNC: 107 MMOL/L (ref 98–107)
CO2 SERPL-SCNC: 28 MMOL/L (ref 21–32)
CREAT SERPL-MCNC: 0.77 MG/DL (ref 0.55–1.02)
DIFFERENTIAL METHOD BLD: ABNORMAL
EGFR (NO RACE VARIABLE) (RUSH/TITUS): 73 ML/MIN/1.73M²
EOSINOPHIL # BLD AUTO: 0.06 K/UL (ref 0–0.5)
EOSINOPHIL NFR BLD AUTO: 0.8 % (ref 1–4)
ERYTHROCYTE [DISTWIDTH] IN BLOOD BY AUTOMATED COUNT: 14.1 % (ref 11.5–14.5)
GLUCOSE SERPL-MCNC: 104 MG/DL (ref 74–106)
HCT VFR BLD AUTO: 38.7 % (ref 38–47)
HGB BLD-MCNC: 12.1 G/DL (ref 12–16)
IMM GRANULOCYTES # BLD AUTO: 0.02 K/UL (ref 0–0.04)
IMM GRANULOCYTES NFR BLD: 0.3 % (ref 0–0.4)
LYMPHOCYTES # BLD AUTO: 2.13 K/UL (ref 1–4.8)
LYMPHOCYTES NFR BLD AUTO: 27 % (ref 27–41)
MCH RBC QN AUTO: 29.3 PG (ref 27–31)
MCHC RBC AUTO-ENTMCNC: 31.3 G/DL (ref 32–36)
MCV RBC AUTO: 93.7 FL (ref 80–96)
MONOCYTES # BLD AUTO: 0.84 K/UL (ref 0–0.8)
MONOCYTES NFR BLD AUTO: 10.7 % (ref 2–6)
MPC BLD CALC-MCNC: 11.4 FL (ref 9.4–12.4)
NEUTROPHILS # BLD AUTO: 4.77 K/UL (ref 1.8–7.7)
NEUTROPHILS NFR BLD AUTO: 60.4 % (ref 53–65)
NRBC # BLD AUTO: 0 X10E3/UL
NRBC, AUTO (.00): 0 %
PLATELET # BLD AUTO: 189 K/UL (ref 150–400)
POTASSIUM SERPL-SCNC: 3.4 MMOL/L (ref 3.5–5.1)
RBC # BLD AUTO: 4.13 M/UL (ref 4.2–5.4)
SODIUM SERPL-SCNC: 142 MMOL/L (ref 136–145)
WBC # BLD AUTO: 7.88 K/UL (ref 4.5–11)

## 2023-04-11 PROCEDURE — 85025 COMPLETE CBC W/AUTO DIFF WBC: CPT

## 2023-04-11 PROCEDURE — 97161 PT EVAL LOW COMPLEX 20 MIN: CPT

## 2023-04-11 PROCEDURE — 80048 BASIC METABOLIC PNL TOTAL CA: CPT

## 2023-04-11 PROCEDURE — 25000003 PHARM REV CODE 250

## 2023-04-11 PROCEDURE — 99232 SBSQ HOSP IP/OBS MODERATE 35: CPT | Mod: ,,, | Performed by: INTERNAL MEDICINE

## 2023-04-11 PROCEDURE — 25000003 PHARM REV CODE 250: Performed by: INTERNAL MEDICINE

## 2023-04-11 PROCEDURE — G0378 HOSPITAL OBSERVATION PER HR: HCPCS | Mod: CS

## 2023-04-11 PROCEDURE — 63600175 PHARM REV CODE 636 W HCPCS

## 2023-04-11 PROCEDURE — 96361 HYDRATE IV INFUSION ADD-ON: CPT

## 2023-04-11 PROCEDURE — 25000003 PHARM REV CODE 250: Performed by: HOSPITALIST

## 2023-04-11 PROCEDURE — 99232 PR SUBSEQUENT HOSPITAL CARE,LEVL II: ICD-10-PCS | Mod: ,,, | Performed by: INTERNAL MEDICINE

## 2023-04-11 PROCEDURE — 97165 OT EVAL LOW COMPLEX 30 MIN: CPT

## 2023-04-11 RX ORDER — QUETIAPINE FUMARATE 25 MG/1
25 TABLET, FILM COATED ORAL 2 TIMES DAILY
Status: DISCONTINUED | OUTPATIENT
Start: 2023-04-11 | End: 2023-04-14 | Stop reason: HOSPADM

## 2023-04-11 RX ORDER — POTASSIUM CHLORIDE 20 MEQ/1
40 TABLET, EXTENDED RELEASE ORAL ONCE
Status: COMPLETED | OUTPATIENT
Start: 2023-04-11 | End: 2023-04-11

## 2023-04-11 RX ADMIN — POTASSIUM CHLORIDE 40 MEQ: 20 TABLET, EXTENDED RELEASE ORAL at 09:04

## 2023-04-11 RX ADMIN — MEMANTINE HYDROCHLORIDE 5 MG: 5 TABLET, FILM COATED ORAL at 09:04

## 2023-04-11 RX ADMIN — DONEPEZIL HYDROCHLORIDE 10 MG: 5 TABLET ORAL at 09:04

## 2023-04-11 RX ADMIN — TRAZODONE HYDROCHLORIDE 50 MG: 50 TABLET ORAL at 09:04

## 2023-04-11 RX ADMIN — ESCITALOPRAM OXALATE 20 MG: 10 TABLET, FILM COATED ORAL at 09:04

## 2023-04-11 RX ADMIN — QUETIAPINE FUMARATE 25 MG: 25 TABLET ORAL at 09:04

## 2023-04-11 RX ADMIN — QUETIAPINE FUMARATE 25 MG: 25 TABLET ORAL at 10:04

## 2023-04-11 RX ADMIN — SODIUM CHLORIDE, POTASSIUM CHLORIDE, SODIUM LACTATE AND CALCIUM CHLORIDE: 600; 310; 30; 20 INJECTION, SOLUTION INTRAVENOUS at 01:04

## 2023-04-11 NOTE — SUBJECTIVE & OBJECTIVE
Interval History: Patient is sitting comfortably in bed. She did overnight have some issues with agitation likely due to her dementia. Hopeful to discharge patient to nursing facility.     Review of Systems   Unable to perform ROS: Mental status change   Objective:     Vital Signs (Most Recent):  Temp: 97.2 °F (36.2 °C) (04/11/23 0600)  Pulse: 81 (04/11/23 0600)  Resp: 20 (04/11/23 0600)  BP: (!) 150/69 (04/11/23 0600)  SpO2: 95 % (04/11/23 0600)   Vital Signs (24h Range):  Temp:  [97.2 °F (36.2 °C)-99 °F (37.2 °C)] 97.2 °F (36.2 °C)  Pulse:  [70-86] 81  Resp:  [18-20] 20  SpO2:  [92 %-95 %] 95 %  BP: (109-150)/(51-69) 150/69     Weight: 68 kg (150 lb)  Body mass index is 24.21 kg/m².  No intake or output data in the 24 hours ending 04/11/23 0827   Physical Exam  Vitals and nursing note reviewed.   Constitutional:       General: She is not in acute distress.     Appearance: She is normal weight. She is not ill-appearing or diaphoretic.   HENT:      Head: Normocephalic.      Right Ear: External ear normal.      Left Ear: External ear normal.      Nose: Nose normal.      Mouth/Throat:      Pharynx: Oropharynx is clear.   Eyes:      General:         Right eye: No discharge.         Left eye: No discharge.      Conjunctiva/sclera: Conjunctivae normal.   Cardiovascular:      Rate and Rhythm: Normal rate and regular rhythm.      Pulses: Normal pulses.      Heart sounds: Normal heart sounds.   Pulmonary:      Effort: No respiratory distress.      Breath sounds: Normal breath sounds. No wheezing, rhonchi or rales.   Abdominal:      General: Bowel sounds are normal.      Tenderness: There is no guarding or rebound.   Musculoskeletal:         General: No deformity.      Right lower leg: No edema.      Left lower leg: No edema.   Skin:     General: Skin is warm.      Coloration: Skin is not jaundiced.   Neurological:      Mental Status: She is lethargic and disoriented.   Psychiatric:      Comments: Unable to perform        Significant Labs: All pertinent labs within the past 24 hours have been reviewed.    Significant Imaging: I have reviewed all pertinent imaging results/findings within the past 24 hours.

## 2023-04-11 NOTE — PLAN OF CARE
Problem: Physical Therapy  Goal: Physical Therapy Goal  Description: Short Term Goals to be met by: 2023    Patient will increase functional independence with mobility by performin. Supine to sit with Stand by assist  2. Sit to stand transfer with Stand by assist using no assistive device  3. Bed to chair transfer with Stand by assist using no assistive device  4. Gait  x 100 feet with Stand by assist using no assistive device  5. Lower extremity exercise program x30 reps per handout, with assistance as needed    Long Term Goals to be met by: 2023    Pt will require <= distant supervision for functional mobility with no assistive device to decrease caregiver burden  Outcome: Ongoing, Progressing       Patient participated well with mobility with PT but is limited in command following due to dementia. Patient would benefit from use of a SPC or RW but refuses to use same due to dementia. PT will focus on improving strength, stamina, and balance to decrease fall risk and decrease caregiver burden. Anticipate swing bed transitioning to long term care placement at d/c.

## 2023-04-11 NOTE — PROGRESS NOTES
[facility]  [facility] Medicine  Progress Note    Patient Name: Acacia Mcgrath  MRN: [id number]  Patient Class: OP- Observation   Admission Date: 4/8/2023  Length of Stay: 0 days  Attending Physician: Daron Ross MD  Primary Care Provider: Molina Hartley MD        Subjective:     Principal Problem:Encephalopathy, metabolic        HPI:  Patient is a 89yo female who presents to the [facility] ED via EMS with AMS, decreased appetite, and general weakness. Patient's daughter in room provided all of the history. Patient has a history of Alzheimer's dementia and depression. In the past 3-4 weeks, patient has been having increased sleep (up to 24 hours), decreased energy, inability to walk, confusion, disorientation that have been progressively getting worse. Patient at baseline could communicate and walk by leaning on the wall but she hasn't been able to do so in the past few weeks. Patient needs a walker and cane to walk at baseline but refuses to use them.    In the past week, patient started having left arm pain and decreased motor strength. Patient is usually able to stand up with support of her arms on the chair but hasn't been able to do so in the past week. Patient has had similar episodes about 2 weeks ago that improved but got worse again. 9 days ago, patient had a home visit from her PCP Dr Gomez for similar symptoms and was told she had an UTI. She was treated with Macrobid for 5 days and had 1 day of antibiotic left. Patient lives at home with daughter. Daughter is getting concerned recently about taking care of patient because she couldn't pick her up when she couldn't stand on her own. She is interested in nursing home placement for the patient. Patient has urinary urgency and incontinence due to not being able to walk to the bathroom in time. Daughter denies shakes, seizures, falls, syncope, history of stroke, MI.    In the ED, vitals 135/59, 98.3, 73, 13, 96% on RA. Labs unremarkable  except for Cr 1.1, glucose 117, GFR 48. Lactate 1.4, COVID negative. UA negative for UTI. EKG paced rhythm 69. CT head, CXR , KUB pending. Patient is admitted to hospital medicine for observation and further management.      Overview/Hospital Course:  No notes on file    Interval History: Patient is sitting comfortably in bed. She did overnight have some issues with agitation likely due to her dementia. Hopeful to discharge patient to nursing facility.     Review of Systems   Unable to perform ROS: Mental status change   Objective:     Vital Signs (Most Recent):  Temp: 97.2 °F (36.2 °C) (04/11/23 0600)  Pulse: 81 (04/11/23 0600)  Resp: 20 (04/11/23 0600)  BP: (!) 150/69 (04/11/23 0600)  SpO2: 95 % (04/11/23 0600)   Vital Signs (24h Range):  Temp:  [97.2 °F (36.2 °C)-99 °F (37.2 °C)] 97.2 °F (36.2 °C)  Pulse:  [70-86] 81  Resp:  [18-20] 20  SpO2:  [92 %-95 %] 95 %  BP: (109-150)/(51-69) 150/69     Weight: 68 kg (150 lb)  Body mass index is 24.21 kg/m².  No intake or output data in the 24 hours ending 04/11/23 0827   Physical Exam  Vitals and nursing note reviewed.   Constitutional:       General: She is not in acute distress.     Appearance: She is normal weight. She is not ill-appearing or diaphoretic.   HENT:      Head: Normocephalic.      Right Ear: External ear normal.      Left Ear: External ear normal.      Nose: Nose normal.      Mouth/Throat:      Pharynx: Oropharynx is clear.   Eyes:      General:         Right eye: No discharge.         Left eye: No discharge.      Conjunctiva/sclera: Conjunctivae normal.   Cardiovascular:      Rate and Rhythm: Normal rate and regular rhythm.      Pulses: Normal pulses.      Heart sounds: Normal heart sounds.   Pulmonary:      Effort: No respiratory distress.      Breath sounds: Normal breath sounds. No wheezing, rhonchi or rales.   Abdominal:      General: Bowel sounds are normal.      Tenderness: There is no guarding or rebound.   Musculoskeletal:         General: No  deformity.      Right lower leg: No edema.      Left lower leg: No edema.   Skin:     General: Skin is warm.      Coloration: Skin is not jaundiced.   Neurological:      Mental Status: She is lethargic and disoriented.   Psychiatric:      Comments: Unable to perform       Significant Labs: All pertinent labs within the past 24 hours have been reviewed.    Significant Imaging: I have reviewed all pertinent imaging results/findings within the past 24 hours.      Assessment/Plan:      * Encephalopathy, metabolic  Etiology is unknown but the patient has just completed a 5-day course of Macrobid for UTI but repeated urinalysis is negative. Continue IV hydration at 100 mL/h and monitor. Pending PT/OT evaluation.     Negative CT head, chest Xray, and KUB      Chronic low back pain  Holding home gabapentin         Hypertension  Blood pressure is currently controlled  We will monitor for now      Senile dementia with depression  - Reduce home Donepezil from 5 mg BID to once daily and reduce home Memantine XR 7 mg TID to Immediate release BID while in the hospital  - Continue home Lexapro 20 mg daily  - Continue to monitor  - Social service consulted, thanks for the assistance        VTE Risk Mitigation (From admission, onward)         Ordered     IP VTE HIGH RISK PATIENT  Once         04/09/23 0327     Place sequential compression device  Until discontinued         04/09/23 0327                Discharge Planning   KATH:      Code Status: DNR   Is the patient medically ready for discharge?:     Reason for patient still in hospital (select all that apply): Treatment  Discharge Plan A: New Nursing Home placement - assisted care facility, Assisted Living                  Walt Alexandre DO  Department of Hospital Medicine   [facility]

## 2023-04-11 NOTE — NURSING
Pt noted with increased confusion, restlessness, attempting to get out of bed. Dr. Tovar notified, new order for seroquel obtained.

## 2023-04-11 NOTE — PT/OT/SLP EVAL
Occupational Therapy   Evaluation and Discharge Note    Name: Acacia Mcgrath  MRN: [id number]  Admitting Diagnosis: Encephalopathy, metabolic  Recent Surgery: * No surgery found *      Recommendations:     Discharge Recommendations: intermediate care facility/nursing home  Discharge Equipment Recommendations: none  Barriers to discharge:  None    Assessment:     Acacia Mcgrath is a 89 y.o. female with a medical diagnosis of Encephalopathy, metabolic. At this time, patient is functioning at their prior level of function and does not require further acute OT services.     Plan:     During this hospitalization, patient does not require further acute OT services.  Please re-consult if situation changes.    Plan of Care Reviewed with: patient    Subjective     Chief Complaint: Pt admitted with decline in mental status, decreased appetite, and decline in functional abilities.  Patient/Family Comments/goals: Pt's daughter is looking for long term care placement that will be close to home.     Occupational Profile:  Living Environment: Pt lives with her daughter in a single level home  Previous level of function: Pt refuses a shower and refuses to allow anyone to assist her with her bathing. Daughter reports multiple different home health workers have tried. Pt performs her own bathing from the sink with questionable thoroughness. Pt was dressing her self, but over the past couple of weeks, just stays in her pajamas.  Roles and Routines: Pt was ambulating with assistance and furniture cruising. Pt has cane and walker, but refuses to use them.  Equipment Used at home:  (Pt had but did not use: rolling walker, cane, and showerchair)  Assistance upon Discharge: Pt will have staff assistance at long term care facility    Pain/Comfort:  Pain Rating 1: 0/10  Pain Rating Post-Intervention 1: 0/10    Patients cultural, spiritual, Taoism conflicts given the current situation:  none    Objective:     Communicated with: RN  Sean Mckeon prior to session.  Patient found  standing with CNA, getting her brief placed on after sitting on BSC  with peripheral IV, telemetry upon OT entry to room.    General Precautions: Standard, fall  Orthopedic Precautions: N/A  Braces: N/A  Respiratory Status: Room air     Occupational Performance:    Bed Mobility:    NT    Functional Mobility/Transfers:  Patient completed Sit <> Stand Transfer with contact guard assistance  with  handheld assist   Patient completed Bed <> Chair Transfer using Step Transfer technique with contact guard assistance and minimum assistance with hand-held assist and 2 persons  Patient completed bed side commode Transfer Step Transfer technique with contact guard assistance and minimum assistance with  hand-held assist and 2 persons  Functional Mobility: Pt ambulated with short steps with hand held assistance    Activities of Daily Living:  Upper Body Dressing: maximal assistance gown as robe  Toileting: total assistance      Cognitive/Visual Perceptual:  Cognitive/Psychosocial Skills:     -       Oriented to: Person   -       Follows Commands/attention:Follows one-step commands  -       Communication: slow to respond, but clear  -       Safety awareness/insight to disability: impaired   -       Mood/Affect/Coping skills/emotional control: Cooperative    Physical Exam:  Balance:    -       standing balance is fair (-)  Edema:  None noted  Upper Extremity Range of Motion:     -       Right Upper Extremity: WFL  -       Left Upper Extremity: WFL  Upper Extremity Strength:    -       Right Upper Extremity: WFL  -       Left Upper Extremity: WFL   Strength:    -       Right Upper Extremity: WFL  -       Left Upper Extremity: WFL    AMPAC 6 Click ADL:  AMPAC Total Score:      Treatment & Education:   Importance of OOB activity with staff assistance.  Importance of sitting up in the chair throughout the day as tolerated, especially for meals   Safety during functional t/f and  mobility with staff assistance  Importance of assisting with self-care activities       Patient left up in chair with all lines intact, call button in reach, RN Sean notified, and daughter present    GOALS:   Multidisciplinary Problems       Occupational Therapy Goals       Not on file                    History:     Past Medical History:   Diagnosis Date    Cancer     Chronic low back pain     GERD (gastroesophageal reflux disease)     Hyperlipidemia     Hypertension     Other specified types of non-hodgkin lymphoma, unspecified site     Senile dementia with depression     Vitamin D deficiency          Past Surgical History:   Procedure Laterality Date    BONE DENSITY  11/06/2018    CHOLECYSTECTOMY      INSERTION OF PACEMAKER      Dr. Vinayak Whitney       Time Tracking:     OT Date of Treatment: 04/11/23  OT Start Time:    OT Stop Time: 0948  OT Total Time (min):      Billable Minutes:Evaluation low complexity    4/11/2023

## 2023-04-11 NOTE — PT/OT/SLP EVAL
Physical Therapy Evaluation    Patient Name:  Acacia Mcgrath   MRN:  [id number]    Recommendations:     Discharge Recommendations: intermediate care facility/nursing home, nursing facility, skilled   Discharge Equipment Recommendations: none   Barriers to discharge:  awaiting placement    Assessment:     Acacia Mcgrath is a 89 y.o. female admitted with a medical diagnosis of Encephalopathy, metabolic.  She presents with the following impairments/functional limitations: weakness, impaired endurance, impaired self care skills, impaired functional mobility, gait instability, impaired balance, impaired cognition, decreased safety awareness .    Patient participated well with mobility with PT but is limited in command following due to dementia. Patient would benefit from use of a SPC or RW but refuses to use same due to dementia. PT will focus on improving strength, stamina, and balance to decrease fall risk and decrease caregiver burden. Anticipate swing bed transitioning to long term care placement at /.    Rehab Prognosis: Good; patient would benefit from acute skilled PT services to address these deficits and reach maximum level of function.    Recent Surgery: * No surgery found *      Plan:     During this hospitalization, patient to be seen 5 x/week to address the identified rehab impairments via gait training, therapeutic activities, therapeutic exercises, neuromuscular re-education and progress toward the following goals:    Plan of Care Expires:  05/11/23    Subjective     Chief Complaint: metabolic encephalopathy  Patient/Family Comments/goals: patient confused but pleasant and cooperative with PT  Pain/Comfort:  Pain Rating 1: 0/10  Pain Rating Post-Intervention 1: 0/10    Patients cultural, spiritual, Sikh conflicts given the current situation: no    Living Environment:  Pt has been living with her daughter in a single level home with no steps to enter  Prior to admission, 2 months ago patients level  of function was distant supervision and occasional assist with ADLs, no AD used. Daughter reports a gradual decline in functional mobility with worsening balance and increased need for daughter physical assist for mobility particularly in the last 2 weeks. Patient refuses to use her AD due to dementia..  Equipment used at home: cane, straight, walker, rolling, shower chair (did not use).  DME owned (not currently used): rolling walker, single point cane, and shower chair.  Upon discharge, patient will have assistance from nursing home vs swing bed staff.    Objective:     Communicated with Sean Mckeon RN prior to session.  Patient found  standing at bedside with aide assisting pt with hygiene  with peripheral IV, telemetry  upon PT entry to room.    General Precautions: Standard, fall  Orthopedic Precautions:N/A   Braces: N/A  Respiratory Status: Room air    Exams:  Cognitive Exam:  Patient is oriented to Person  Sensation:    -       Intact grossly as pt aware of limbs but can not follow commands for specific testing  RLE ROM: WFL  RLE Strength: Deficits: hip 4/5; knee 4+/5; ankle 5/5  LLE ROM: WFL  LLE Strength: Deficits: as per right    Functional Mobility:  Bed Mobility:     Supine to Sit: minimum assistance with help of PCT; pt out of bed already on PT arrival  Transfers:     Sit to Stand:  minimum assistance with hand-held assist  Bed to Chair: minimum assistance with  hand-held assist  using  Step Transfer  Gait: 60' using HHA, short step lengths, mild increase in trunk sway, mild guarding, mild increased LOIS; no acute LOB; pt appeared fatigued      AM-PAC 6 CLICK MOBILITY  Total Score:16       Treatment & Education:  Pt and daughter educated on PT role/POC.   Importance of OOB activity with staff assistance.  Importance of sitting up in the chair throughout the day as tolerated, especially for meals   Safety during functional t/f and mobility   White board updated   Multiple self-care  tasks/functional mobility completed- assistance level noted above   All questions/concerns answered within PT scope of practice     Patient left up in chair with all lines intact, call button in reach, chair alarm on, Sean Mckeon RN notified, and daughter present.    GOALS:   Multidisciplinary Problems       Physical Therapy Goals          Problem: Physical Therapy    Goal Priority Disciplines Outcome Goal Variances Interventions   Physical Therapy Goal     PT, PT/OT Ongoing, Progressing     Description: Short Term Goals to be met by: 2023    Patient will increase functional independence with mobility by performin. Supine to sit with Stand by assist  2. Sit to stand transfer with Stand by assist using no assistive device  3. Bed to chair transfer with Stand by assist using no assistive device  4. Gait  x 100 feet with Stand by assist using no assistive device  5. Lower extremity exercise program x30 reps per handout, with assistance as needed    Long Term Goals to be met by: 2023    Pt will require <= distant supervision for functional mobility with no assistive device to decrease caregiver burden                       History:     Past Medical History:   Diagnosis Date    Cancer     Chronic low back pain     GERD (gastroesophageal reflux disease)     Hyperlipidemia     Hypertension     Other specified types of non-hodgkin lymphoma, unspecified site     Senile dementia with depression     Vitamin D deficiency        Past Surgical History:   Procedure Laterality Date    BONE DENSITY  2018    CHOLECYSTECTOMY      INSERTION OF PACEMAKER      Dr. Vinayak Whitney       Time Tracking:     PT Received On: 23  PT Start Time: 933     PT Stop Time: 946  PT Total Time (min): 13 min     Billable Minutes: Evaluation Low complexity      2023

## 2023-04-12 LAB
ANION GAP SERPL CALCULATED.3IONS-SCNC: 15 MMOL/L (ref 7–16)
BASOPHILS # BLD AUTO: 0.07 K/UL (ref 0–0.2)
BASOPHILS NFR BLD AUTO: 0.8 % (ref 0–1)
BUN SERPL-MCNC: 7 MG/DL (ref 7–18)
BUN/CREAT SERPL: 9 (ref 6–20)
CALCIUM SERPL-MCNC: 9 MG/DL (ref 8.5–10.1)
CHLORIDE SERPL-SCNC: 106 MMOL/L (ref 98–107)
CO2 SERPL-SCNC: 26 MMOL/L (ref 21–32)
CREAT SERPL-MCNC: 0.77 MG/DL (ref 0.55–1.02)
DIFFERENTIAL METHOD BLD: ABNORMAL
EGFR (NO RACE VARIABLE) (RUSH/TITUS): 73 ML/MIN/1.73M²
EOSINOPHIL # BLD AUTO: 0.09 K/UL (ref 0–0.5)
EOSINOPHIL NFR BLD AUTO: 1 % (ref 1–4)
ERYTHROCYTE [DISTWIDTH] IN BLOOD BY AUTOMATED COUNT: 14.4 % (ref 11.5–14.5)
GLUCOSE SERPL-MCNC: 75 MG/DL (ref 74–106)
HCT VFR BLD AUTO: 37.6 % (ref 38–47)
HGB BLD-MCNC: 12.2 G/DL (ref 12–16)
IMM GRANULOCYTES # BLD AUTO: 0.02 K/UL (ref 0–0.04)
IMM GRANULOCYTES NFR BLD: 0.2 % (ref 0–0.4)
LYMPHOCYTES # BLD AUTO: 2.19 K/UL (ref 1–4.8)
LYMPHOCYTES NFR BLD AUTO: 23.8 % (ref 27–41)
MCH RBC QN AUTO: 29.7 PG (ref 27–31)
MCHC RBC AUTO-ENTMCNC: 32.4 G/DL (ref 32–36)
MCV RBC AUTO: 91.5 FL (ref 80–96)
MONOCYTES # BLD AUTO: 0.85 K/UL (ref 0–0.8)
MONOCYTES NFR BLD AUTO: 9.2 % (ref 2–6)
MPC BLD CALC-MCNC: 11.4 FL (ref 9.4–12.4)
NEUTROPHILS # BLD AUTO: 5.99 K/UL (ref 1.8–7.7)
NEUTROPHILS NFR BLD AUTO: 65 % (ref 53–65)
NRBC # BLD AUTO: 0 X10E3/UL
NRBC, AUTO (.00): 0 %
PLATELET # BLD AUTO: 199 K/UL (ref 150–400)
POTASSIUM SERPL-SCNC: 3.8 MMOL/L (ref 3.5–5.1)
RBC # BLD AUTO: 4.11 M/UL (ref 4.2–5.4)
SODIUM SERPL-SCNC: 143 MMOL/L (ref 136–145)
WBC # BLD AUTO: 9.21 K/UL (ref 4.5–11)

## 2023-04-12 PROCEDURE — 96361 HYDRATE IV INFUSION ADD-ON: CPT

## 2023-04-12 PROCEDURE — 63600175 PHARM REV CODE 636 W HCPCS

## 2023-04-12 PROCEDURE — 80048 BASIC METABOLIC PNL TOTAL CA: CPT

## 2023-04-12 PROCEDURE — 99232 SBSQ HOSP IP/OBS MODERATE 35: CPT | Mod: GC,,, | Performed by: FAMILY MEDICINE

## 2023-04-12 PROCEDURE — 85025 COMPLETE CBC W/AUTO DIFF WBC: CPT

## 2023-04-12 PROCEDURE — 97530 THERAPEUTIC ACTIVITIES: CPT

## 2023-04-12 PROCEDURE — 99232 PR SUBSEQUENT HOSPITAL CARE,LEVL II: ICD-10-PCS | Mod: GC,,, | Performed by: FAMILY MEDICINE

## 2023-04-12 PROCEDURE — G0378 HOSPITAL OBSERVATION PER HR: HCPCS

## 2023-04-12 RX ADMIN — SODIUM CHLORIDE, POTASSIUM CHLORIDE, SODIUM LACTATE AND CALCIUM CHLORIDE: 600; 310; 30; 20 INJECTION, SOLUTION INTRAVENOUS at 07:04

## 2023-04-12 RX ADMIN — SODIUM CHLORIDE, POTASSIUM CHLORIDE, SODIUM LACTATE AND CALCIUM CHLORIDE: 600; 310; 30; 20 INJECTION, SOLUTION INTRAVENOUS at 02:04

## 2023-04-12 NOTE — ASSESSMENT & PLAN NOTE
Etiology is unknown but the patient has just completed a 5-day course of Macrobid for UTI but repeated urinalysis is negative. Continue IV hydration at 100 mL/h and monitor.     Negative CT head, chest Xray, and KUB

## 2023-04-12 NOTE — ASSESSMENT & PLAN NOTE
- Reduce home Donepezil from 5 mg BID to once daily and reduce home Memantine XR 7 mg TID to Immediate release BID while in the hospital, patient started on seroquel for agitation   - Continue home Lexapro 20 mg daily  - Continue to monitor  - Social service consulted, thanks for the assistance

## 2023-04-12 NOTE — PLAN OF CARE
SERGE left message with Sindi at [facility] [place] to find out if pt has been accepted for long term nursing home placement. SW awaiting call back.

## 2023-04-12 NOTE — SUBJECTIVE & OBJECTIVE
Interval History: Patient is sitting comfortably in bed. She is accompanied by her two daughters they report they have arranged a nursing facility for her and will be ready to move her in tomorrow. Plan for discharge tomorrow.     Review of Systems   Unable to perform ROS: Mental status change   Objective:     Vital Signs (Most Recent):  Temp: 97.9 °F (36.6 °C) (04/12/23 0717)  Pulse: 82 (04/12/23 0717)  Resp: 16 (04/12/23 0717)  BP: (!) 149/67 (04/12/23 0717)  SpO2: (!) 94 % (04/12/23 0717)   Vital Signs (24h Range):  Temp:  [96.9 °F (36.1 °C)-99.1 °F (37.3 °C)] 97.9 °F (36.6 °C)  Pulse:  [74-94] 82  Resp:  [16-20] 16  SpO2:  [94 %-97 %] 94 %  BP: (106-154)/(58-75) 149/67     Weight: 68 kg (150 lb)  Body mass index is 24.21 kg/m².  No intake or output data in the 24 hours ending 04/12/23 1110   Physical Exam  Vitals and nursing note reviewed.   Constitutional:       General: She is not in acute distress.     Appearance: She is normal weight. She is not ill-appearing or diaphoretic.   HENT:      Head: Normocephalic.      Right Ear: External ear normal.      Left Ear: External ear normal.      Nose: Nose normal.      Mouth/Throat:      Pharynx: Oropharynx is clear.   Eyes:      General:         Right eye: No discharge.         Left eye: No discharge.      Conjunctiva/sclera: Conjunctivae normal.   Cardiovascular:      Rate and Rhythm: Normal rate and regular rhythm.      Pulses: Normal pulses.      Heart sounds: Normal heart sounds.   Pulmonary:      Effort: No respiratory distress.      Breath sounds: Normal breath sounds. No wheezing, rhonchi or rales.   Abdominal:      General: Bowel sounds are normal.      Tenderness: There is no guarding or rebound.   Musculoskeletal:         General: No deformity.      Right lower leg: No edema.      Left lower leg: No edema.   Skin:     General: Skin is warm.      Coloration: Skin is not jaundiced.   Neurological:      Mental Status: She is lethargic and disoriented.    Psychiatric:      Comments: Unable to perform       Significant Labs: All pertinent labs within the past 24 hours have been reviewed.    Significant Imaging: I have reviewed all pertinent imaging results/findings within the past 24 hours.

## 2023-04-12 NOTE — PROGRESS NOTES
[facility]  [facility] Medicine  Progress Note    Patient Name: Acacia Mcgrath  MRN: [id number]  Patient Class: OP- Observation   Admission Date: 4/8/2023  Length of Stay: 0 days  Attending Physician: Chavo Schultz Jr., MD  Primary Care Provider: Molina Hartley MD        Subjective:     Principal Problem:Encephalopathy, metabolic        HPI:  Patient is a 89yo female who presents to the [facility] ED via EMS with AMS, decreased appetite, and general weakness. Patient's daughter in room provided all of the history. Patient has a history of Alzheimer's dementia and depression. In the past 3-4 weeks, patient has been having increased sleep (up to 24 hours), decreased energy, inability to walk, confusion, disorientation that have been progressively getting worse. Patient at baseline could communicate and walk by leaning on the wall but she hasn't been able to do so in the past few weeks. Patient needs a walker and cane to walk at baseline but refuses to use them.    In the past week, patient started having left arm pain and decreased motor strength. Patient is usually able to stand up with support of her arms on the chair but hasn't been able to do so in the past week. Patient has had similar episodes about 2 weeks ago that improved but got worse again. 9 days ago, patient had a home visit from her PCP Dr Gomez for similar symptoms and was told she had an UTI. She was treated with Macrobid for 5 days and had 1 day of antibiotic left. Patient lives at home with daughter. Daughter is getting concerned recently about taking care of patient because she couldn't pick her up when she couldn't stand on her own. She is interested in nursing home placement for the patient. Patient has urinary urgency and incontinence due to not being able to walk to the bathroom in time. Daughter denies shakes, seizures, falls, syncope, history of stroke, MI.    In the ED, vitals 135/59, 98.3, 73, 13, 96% on RA. Labs  unremarkable except for Cr 1.1, glucose 117, GFR 48. Lactate 1.4, COVID negative. UA negative for UTI. EKG paced rhythm 69. CT head, CXR , KUB pending. Patient is admitted to hospital medicine for observation and further management.      Overview/Hospital Course:  No notes on file    Interval History: Patient is sitting comfortably in bed. She is accompanied by her two daughters they report they have arranged a nursing facility for her and will be ready to move her in tomorrow. Plan for discharge tomorrow.     Review of Systems   Unable to perform ROS: Mental status change   Objective:     Vital Signs (Most Recent):  Temp: 97.9 °F (36.6 °C) (04/12/23 0717)  Pulse: 82 (04/12/23 0717)  Resp: 16 (04/12/23 0717)  BP: (!) 149/67 (04/12/23 0717)  SpO2: (!) 94 % (04/12/23 0717)   Vital Signs (24h Range):  Temp:  [96.9 °F (36.1 °C)-99.1 °F (37.3 °C)] 97.9 °F (36.6 °C)  Pulse:  [74-94] 82  Resp:  [16-20] 16  SpO2:  [94 %-97 %] 94 %  BP: (106-154)/(58-75) 149/67     Weight: 68 kg (150 lb)  Body mass index is 24.21 kg/m².  No intake or output data in the 24 hours ending 04/12/23 1110   Physical Exam  Vitals and nursing note reviewed.   Constitutional:       General: She is not in acute distress.     Appearance: She is normal weight. She is not ill-appearing or diaphoretic.   HENT:      Head: Normocephalic.      Right Ear: External ear normal.      Left Ear: External ear normal.      Nose: Nose normal.      Mouth/Throat:      Pharynx: Oropharynx is clear.   Eyes:      General:         Right eye: No discharge.         Left eye: No discharge.      Conjunctiva/sclera: Conjunctivae normal.   Cardiovascular:      Rate and Rhythm: Normal rate and regular rhythm.      Pulses: Normal pulses.      Heart sounds: Normal heart sounds.   Pulmonary:      Effort: No respiratory distress.      Breath sounds: Normal breath sounds. No wheezing, rhonchi or rales.   Abdominal:      General: Bowel sounds are normal.      Tenderness: There is no  guarding or rebound.   Musculoskeletal:         General: No deformity.      Right lower leg: No edema.      Left lower leg: No edema.   Skin:     General: Skin is warm.      Coloration: Skin is not jaundiced.   Neurological:      Mental Status: She is lethargic and disoriented.   Psychiatric:      Comments: Unable to perform       Significant Labs: All pertinent labs within the past 24 hours have been reviewed.    Significant Imaging: I have reviewed all pertinent imaging results/findings within the past 24 hours.      Assessment/Plan:      * Encephalopathy, metabolic  Etiology is unknown but the patient has just completed a 5-day course of Macrobid for UTI but repeated urinalysis is negative. Continue IV hydration at 100 mL/h and monitor.     Negative CT head, chest Xray, and KUB      Chronic low back pain  Holding home gabapentin         Hypertension  Blood pressure is currently controlled  We will monitor for now      Senile dementia with depression  - Reduce home Donepezil from 5 mg BID to once daily and reduce home Memantine XR 7 mg TID to Immediate release BID while in the hospital, patient started on seroquel for agitation   - Continue home Lexapro 20 mg daily  - Continue to monitor  - Social service consulted, thanks for the assistance        VTE Risk Mitigation (From admission, onward)         Ordered     IP VTE HIGH RISK PATIENT  Once         04/09/23 0327     Place sequential compression device  Until discontinued         04/09/23 0327                Discharge Planning   KATH:      Code Status: DNR   Is the patient medically ready for discharge?:     Reason for patient still in hospital (select all that apply): Treatment  Discharge Plan A: New Nursing Home placement - [facility] care facility, Assisted Living                  Walt Alexandre DO  Department of Hospital Medicine   [facility]

## 2023-04-12 NOTE — PT/OT/SLP PROGRESS
Physical Therapy Treatment    Patient Name:  Acacia Mcgrath   MRN:  [id number]    Recommendations:     Discharge Recommendations: nursing facility, skilled, intermediate care facility/nursing home  Discharge Equipment Recommendations: none  Barriers to discharge: None    Assessment:     Acacia Mcgrath is a 89 y.o. female admitted with a medical diagnosis of Encephalopathy, metabolic.  She presents with the following impairments/functional limitations: weakness, impaired endurance, impaired self care skills, impaired functional mobility, gait instability, impaired balance, impaired cognition, decreased safety awareness .    Patient not able to reproduce functional mobility performance of evaluation. Patient very lethargic and daughters report patient agitated/awake all night. PT attempted treatment but pt unable to stand or ambulate today due to above.    Rehab Prognosis: Fair; patient would benefit from acute skilled PT services to address these deficits and reach maximum level of function.    Recent Surgery: * No surgery found *      Plan:     During this hospitalization, patient to be seen 5 x/week to address the identified rehab impairments via gait training, therapeutic activities, therapeutic exercises, neuromuscular re-education and progress toward the following goals:    Plan of Care Expires:  05/11/23    Subjective     Chief Complaint: encephalopathy  Patient/Family Comments/goals: Pt roused to verbal/tactile stim but much more lethargic than on evaluation  Pain/Comfort:  Pain Rating 1: 0/10  Pain Rating Post-Intervention 1: 0/10      Objective:     Communicated with Jailene Guevara RN prior to session.  Patient found supine with peripheral IV, telemetry upon PT entry to room.     General Precautions: Standard, fall  Orthopedic Precautions: N/A  Braces: N/A  Respiratory Status: Room air     Functional Mobility:  Bed Mobility:     Supine to Sit: maximal assistance and of 2 persons  Sit to Supine: maximal  assistance and of 2 persons  Transfers:     Sit to Stand:  unable to attempt today      AM-PAC 6 CLICK MOBILITY  Turning over in bed (including adjusting bedclothes, sheets and blankets)?: 2  Sitting down on and standing up from a chair with arms (e.g., wheelchair, bedside commode, etc.): 2  Moving from lying on back to sitting on the side of the bed?: 2  Moving to and from a bed to a chair (including a wheelchair)?: 1  Need to walk in hospital room?: 1  Climbing 3-5 steps with a railing?: 1  Basic Mobility Total Score: 9       Treatment & Education:  Sitting balance EOB, min A to mod A; pt initially more alert after the transfer but not able to remain alert to participate with higher level activities.   Bed exercises- patient unable to perform    Patient left left sidelying with all lines intact, call button in reach, bed alarm on, Jailene Guevara RN notified, and daughters present..    GOALS:   Multidisciplinary Problems       Physical Therapy Goals          Problem: Physical Therapy    Goal Priority Disciplines Outcome Goal Variances Interventions   Physical Therapy Goal     PT, PT/OT Ongoing, Progressing     Description: Short Term Goals to be met by: 2023    Patient will increase functional independence with mobility by performin. Supine to sit with Stand by assist  2. Sit to stand transfer with Stand by assist using no assistive device  3. Bed to chair transfer with Stand by assist using no assistive device  4. Gait  x 100 feet with Stand by assist using no assistive device  5. Lower extremity exercise program x30 reps per handout, with assistance as needed    Long Term Goals to be met by: 2023    Pt will require <= distant supervision for functional mobility with no assistive device to decrease caregiver burden                       Time Tracking:     PT Received On: 23  PT Start Time: 1335     PT Stop Time: 1346  PT Total Time (min): 11 min     Billable Minutes: Therapeutic Activity  10 minutes    Treatment Type: Treatment  PT/PTA: PT     Number of PTA visits since last PT visit: 0     04/12/2023

## 2023-04-13 LAB
GLUCOSE SERPL-MCNC: 91 MG/DL (ref 70–105)
TB INDURATION 48 - 72 HR READ: 0 MM

## 2023-04-13 PROCEDURE — 25000003 PHARM REV CODE 250

## 2023-04-13 PROCEDURE — 63600175 PHARM REV CODE 636 W HCPCS

## 2023-04-13 PROCEDURE — 96361 HYDRATE IV INFUSION ADD-ON: CPT

## 2023-04-13 PROCEDURE — G0378 HOSPITAL OBSERVATION PER HR: HCPCS

## 2023-04-13 PROCEDURE — 99232 SBSQ HOSP IP/OBS MODERATE 35: CPT | Mod: GC,,, | Performed by: FAMILY MEDICINE

## 2023-04-13 PROCEDURE — 82962 GLUCOSE BLOOD TEST: CPT

## 2023-04-13 PROCEDURE — 99232 PR SUBSEQUENT HOSPITAL CARE,LEVL II: ICD-10-PCS | Mod: GC,,, | Performed by: FAMILY MEDICINE

## 2023-04-13 PROCEDURE — S5010 5% DEXTROSE AND 0.45% SALINE: HCPCS

## 2023-04-13 RX ORDER — DEXTROSE MONOHYDRATE AND SODIUM CHLORIDE 5; .45 G/100ML; G/100ML
INJECTION, SOLUTION INTRAVENOUS CONTINUOUS
Status: DISCONTINUED | OUTPATIENT
Start: 2023-04-13 | End: 2023-04-14 | Stop reason: HOSPADM

## 2023-04-13 RX ADMIN — DEXTROSE AND SODIUM CHLORIDE: 5; 450 INJECTION, SOLUTION INTRAVENOUS at 07:04

## 2023-04-13 RX ADMIN — SODIUM CHLORIDE, POTASSIUM CHLORIDE, SODIUM LACTATE AND CALCIUM CHLORIDE: 600; 310; 30; 20 INJECTION, SOLUTION INTRAVENOUS at 05:04

## 2023-04-13 NOTE — ASSESSMENT & PLAN NOTE
- Continue home medications   - Continue home Lexapro 20 mg daily  - Continue to monitor  - Social service consulted, thanks for the assistance, patient is being discharged to [place]

## 2023-04-13 NOTE — DISCHARGE SUMMARY
[facility]  [facility] Medicine  Discharge Summary      Patient Name: Acacia Mcgrath  MRN: [id number]  JOZEF: [id number]  Patient Class: OP- Observation  Admission Date: 4/8/2023  Hospital Length of Stay: 0 days  Discharge Date and Time:  04/13/2023 12:43 PM  Attending Physician: Chavo Schultz Jr., MD   Discharging Provider: Walt Alexandre DO  Primary Care Provider: Molina Hartley MD    Primary Care Team: Networked reference to record PCT     HPI:   Patient is a 89yo female who presents to the [facility] ED via EMS with AMS, decreased appetite, and general weakness. Patient's daughter in room provided all of the history. Patient has a history of Alzheimer's dementia and depression. In the past 3-4 weeks, patient has been having increased sleep (up to 24 hours), decreased energy, inability to walk, confusion, disorientation that have been progressively getting worse. Patient at baseline could communicate and walk by leaning on the wall but she hasn't been able to do so in the past few weeks. Patient needs a walker and cane to walk at baseline but refuses to use them.    In the past week, patient started having left arm pain and decreased motor strength. Patient is usually able to stand up with support of her arms on the chair but hasn't been able to do so in the past week. Patient has had similar episodes about 2 weeks ago that improved but got worse again. 9 days ago, patient had a home visit from her PCP Dr Gomez for similar symptoms and was told she had an UTI. She was treated with Macrobid for 5 days and had 1 day of antibiotic left. Patient lives at home with daughter. Daughter is getting concerned recently about taking care of patient because she couldn't pick her up when she couldn't stand on her own. She is interested in nursing home placement for the patient. Patient has urinary urgency and incontinence due to not being able to walk to the bathroom in time. Daughter denies shakes,  seizures, falls, syncope, history of stroke, MI.    In the ED, vitals 135/59, 98.3, 73, 13, 96% on RA. Labs unremarkable except for Cr 1.1, glucose 117, GFR 48. Lactate 1.4, COVID negative. UA negative for UTI. EKG paced rhythm 69. CT head, CXR , KUB pending. Patient is admitted to hospital medicine for observation and further management.      * No surgery found *      Hospital Course:   Patient was admitted to [facility] for alerted mental status. Patient was living with her daughter and states she cannot take of her anymore unfortunately. Patient has history of dementia. CT Head, KUB and CXR all were unremarkable. UA was also unremarkable, patient was given fluids and improved mentally. Patient will be discharged today to [facility]. Patient has met maximum benefit from this hospitalization and will see her PCP within one week after discharge.        Goals of Care Treatment Preferences:  Code Status: DNR      Consults:   Consults (From admission, onward)        Status Ordering Provider     Inpatient consult to Social Work  Once        Provider:  (Not yet assigned)    Completed CHRIS CARPIO     Inpatient consult to Social Work  Once        Provider:  (Not yet assigned)    Completed CHAVA THOMAS          Neuro  Senile dementia with depression  - Continue home medications   - Continue home Lexapro 20 mg daily  - Continue to monitor  - Social service consulted, thanks for the assistance, patient is being discharged to [place]         Final Active Diagnoses:    Diagnosis Date Noted POA    PRINCIPAL PROBLEM:  Encephalopathy, metabolic [G93.41] 04/09/2023 Yes    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     Specialty: Family Medicine  Contact information:  [address]  [place] [address]  [phone]                       Patient Instructions:   No discharge procedures on file.    Significant Diagnostic Studies: Labs:   BMP:   Recent Labs   Lab 04/12/23  0639   GLU 75      K 3.8      CO2 26   BUN 7   CREATININE 0.77   CALCIUM 9.0   , CMP   Recent Labs   Lab 04/12/23  0639      K 3.8      CO2 26   GLU 75   BUN 7   CREATININE 0.77   CALCIUM 9.0   ANIONGAP 15   , CBC   Recent Labs   Lab 04/12/23  0639   WBC 9.21   HGB 12.2   HCT 37.6*       and Lipid Panel No results found for: CHOL, HDL, LDLCALC, TRIG, CHOLHDL  Microbiology:   Urine Culture    Lab Results   Component Value Date    LABURIN >100,000 Escherichia coli (A) 03/30/2023    LABURIN 50,000 Enterobacter aerogenes (A) 03/30/2023     Radiology: X-Ray: CXR: X-Ray Chest 1 View (CXR): No results found for this visit on 04/08/23. and X-Ray Chest PA and Lateral (CXR): No results found for this visit on 04/08/23. and KUB: X-Ray Abdomen AP 1 View (KUB): No results found for this visit on 04/08/23.  CT scan: CT ABDOMEN PELVIS WITH CONTRAST: No results found for this visit on 04/08/23. and CT ABDOMEN PELVIS WITHOUT CONTRAST: No results found for this visit on 04/08/23.    Pending Diagnostic Studies:     None         Medications:  Reconciled Home Medications:      Medication List      CONTINUE taking these medications    donepeziL 5 MG tablet  Commonly known as: ARICEPT  Take 1 tablet (5 mg total) by mouth 2 (two) times a day.     EScitalopram oxalate 20 MG tablet  Commonly known as: LEXAPRO  Take 1 tablet (20 mg total) by mouth Daily.     ibandronate 150 mg tablet  Commonly known as: BONIVA  Take 1 tablet (150 mg total) by mouth every 30 days.     memantine 7 mg Cspx  Commonly known as: NAMENDA XR  Take 1 capsule (7 mg total) by mouth 3 (three) times daily.        STOP taking these medications    gabapentin 100 MG  capsule  Commonly known as: NEURONTIN     nitrofurantoin (macrocrystal-monohydrate) 100 MG capsule  Commonly known as: MACROBID            Indwelling Lines/Drains at time of discharge:   Lines/Drains/Airways     None                 Time spent on the discharge of patient: 40 minutes         Walt Alexandre DO  Department of Hospital Medicine  [facility]

## 2023-04-13 NOTE — PLAN OF CARE
Yajaira from [facility] called and stated that there were 2 places that were not signed by physician. [facility] spoke with Physicians and the pages that were not signed needed to be completed by pts PCP per physician. SERGE informed, pts daughter Ryan. Physician to speak with pt re: dc. No other needs.

## 2023-04-13 NOTE — PROGRESS NOTES
[facility]  [facility] Medicine  Progress Note    Patient Name: Acacia Mcgrath  MRN: [id number]  Patient Class: OP- Observation   Admission Date: 4/8/2023  Length of Stay: 0 days  Attending Physician: Chavo Schultz Jr., MD  Primary Care Provider: Molina Hartley MD        Subjective:     Principal Problem:Encephalopathy, metabolic        HPI:  Patient is a 89yo female who presents to the [facility] ED via EMS with AMS, decreased appetite, and general weakness. Patient's daughter in room provided all of the history. Patient has a history of Alzheimer's dementia and depression. In the past 3-4 weeks, patient has been having increased sleep (up to 24 hours), decreased energy, inability to walk, confusion, disorientation that have been progressively getting worse. Patient at baseline could communicate and walk by leaning on the wall but she hasn't been able to do so in the past few weeks. Patient needs a walker and cane to walk at baseline but refuses to use them.    In the past week, patient started having left arm pain and decreased motor strength. Patient is usually able to stand up with support of her arms on the chair but hasn't been able to do so in the past week. Patient has had similar episodes about 2 weeks ago that improved but got worse again. 9 days ago, patient had a home visit from her PCP Dr Gomez for similar symptoms and was told she had an UTI. She was treated with Macrobid for 5 days and had 1 day of antibiotic left. Patient lives at home with daughter. Daughter is getting concerned recently about taking care of patient because she couldn't pick her up when she couldn't stand on her own. She is interested in nursing home placement for the patient. Patient has urinary urgency and incontinence due to not being able to walk to the bathroom in time. Daughter denies shakes, seizures, falls, syncope, history of stroke, MI.    In the ED, vitals 135/59, 98.3, 73, 13, 96% on RA. Labs  unremarkable except for Cr 1.1, glucose 117, GFR 48. Lactate 1.4, COVID negative. UA negative for UTI. EKG paced rhythm 69. CT head, CXR , KUB pending. Patient is admitted to hospital medicine for observation and further management.      Overview/Hospital Course:  Patient was admitted to [facility] for alerted mental status. Patient was living with her daughter and states she cannot take of her anymore unfortunately. Patient has history of dementia. CT Head, KUB and CXR all were unremarkable. UA was also unremarkable, patient was given fluids and improved mentally. Patient will be discharged today to [facility]. Patient has met maximum benefit from this hospitalization and will see her PCP within one week after discharge.       Interval History: Patient is sitting comfortably in bed. Patient will be discharged to [place] tomorrow. Patient is in no acute distress at this time.     Review of Systems   Unable to perform ROS: Mental status change   Objective:     Vital Signs (Most Recent):  Temp: 97.6 °F (36.4 °C) (04/13/23 1149)  Pulse: 70 (04/13/23 1149)  Resp: 18 (04/13/23 1149)  BP: (!) 142/71 (04/13/23 1149)  SpO2: 95 % (04/13/23 1149)   Vital Signs (24h Range):  Temp:  [96.7 °F (35.9 °C)-98.6 °F (37 °C)] 97.6 °F (36.4 °C)  Pulse:  [70-86] 70  Resp:  [16-18] 18  SpO2:  [93 %-96 %] 95 %  BP: (119-149)/(57-76) 142/71     Weight: 68 kg (150 lb)  Body mass index is 24.21 kg/m².    Intake/Output Summary (Last 24 hours) at 4/13/2023 1713  Last data filed at 4/13/2023 0507  Gross per 24 hour   Intake 750 ml   Output --   Net 750 ml      Physical Exam  Vitals and nursing note reviewed.   Constitutional:       General: She is not in acute distress.     Appearance: She is normal weight. She is not ill-appearing or diaphoretic.   HENT:      Head: Normocephalic.      Right Ear: External ear normal.      Left Ear: External ear normal.      Nose: Nose normal.      Mouth/Throat:      Pharynx:  Oropharynx is clear.   Eyes:      General:         Right eye: No discharge.         Left eye: No discharge.      Conjunctiva/sclera: Conjunctivae normal.   Cardiovascular:      Rate and Rhythm: Normal rate and regular rhythm.      Pulses: Normal pulses.      Heart sounds: Normal heart sounds.   Pulmonary:      Effort: No respiratory distress.      Breath sounds: Normal breath sounds. No wheezing, rhonchi or rales.   Abdominal:      General: Bowel sounds are normal.      Tenderness: There is no guarding or rebound.   Musculoskeletal:         General: No deformity.      Right lower leg: No edema.      Left lower leg: No edema.   Skin:     General: Skin is warm.      Coloration: Skin is not jaundiced.   Neurological:      Mental Status: She is lethargic and disoriented.   Psychiatric:      Comments: Unable to perform       Significant Labs: All pertinent labs within the past 24 hours have been reviewed.    Significant Imaging: I have reviewed all pertinent imaging results/findings within the past 24 hours.      Assessment/Plan:      * Encephalopathy, metabolic  Etiology is unknown but the patient has just completed a 5-day course of Macrobid for UTI but repeated urinalysis is negative. Continue IV hydration at 75 mL/h and monitor.     Negative CT head, chest Xray, and KUB      Chronic low back pain  Holding home gabapentin         Hypertension  Blood pressure is currently controlled  We will monitor for now      Senile dementia with depression  - Continue home medications   - Continue home Lexapro 20 mg daily  - Continue to monitor  - Social service consulted, thanks for the assistance, patient is being discharged to [place]         VTE Risk Mitigation (From admission, onward)         Ordered     IP VTE HIGH RISK PATIENT  Once         04/09/23 0327     Place sequential compression device  Until discontinued         04/09/23 0327                Discharge Planning   KATH: 4/13/2023     Code Status: DNR   Is the patient  medically ready for discharge?:     Reason for patient still in hospital (select all that apply): Treatment  Discharge Plan A: New Nursing Home placement - snf care facility, Assisted Living   Discharge Delays: None known at this time              Walt Alexandre DO  Department of Hospital Medicine   [facility]

## 2023-04-13 NOTE — PLAN OF CARE
SW spoke with [facility] and pt has been denied for [facility] and [facility]. SW awaiting decision for [facility]. SW following.

## 2023-04-13 NOTE — PLAN OF CARE
[facility]  Discharge Final Note    Primary Care Provider: Molina Hartley MD    Expected Discharge Date: 4/13/2023    Final Discharge Note (most recent)       Final Note - 04/13/23 1220          Final Note    Assessment Type Final Discharge Note     Anticipated Discharge Disposition Home or Self Care   [facility] Care Unit    What phone number can be called within the next 1-3 days to see how you are doing after discharge? [phone]        Post-Acute Status    Post-Acute Authorization Placement     Post-Acute Placement Status Set-up Complete/Auth obtained     Patient choice form signed by patient/caregiver List with quality metrics by geographic area provided;List from CMS Compare     Discharge Delays None known at this time                     Important Message from Medicare             Contact Info       Molina Hartley MD   Specialty: Family Medicine   Relationship: PCP - General    [address] [place] [id number]   Phone: [phone]       Next Steps: Follow up in 1 week(s)          Pt has been accepted to [facility] in the Memory Care Unit. Pt to dc today. SW faxed H&P, cxr and tb skin test to [place] at [facility] as requested. No other needs.

## 2023-04-13 NOTE — NURSING
This pt's daughter said that [facility] will not accept her without the correct paperwork. They also told her that they can come here and pick her up.     SS Jose Case has been notified.

## 2023-04-13 NOTE — HOSPITAL COURSE
Patient was admitted to [facility] for alerted mental status. Patient was living with her daughter and states she cannot take of her anymore unfortunately. Patient has history of dementia. CT Head, KUB and CXR all were unremarkable. UA was also unremarkable, patient was given fluids and improved mentally. Patient will be discharged today to [facility]. Patient has met maximum benefit from this hospitalization and will see her PCP within one week after discharge.

## 2023-04-13 NOTE — PT/OT/SLP PROGRESS
Physical Therapy      Patient Name:  Acacia Mcgrath   MRN:  [id number]    Patient not seen today secondary to Patient unwilling to participate pt asleep upon entering room requring heavy verbal and tactile cueing to rouse.  pt opening eyes briefly then covering head and ingnoring therapist. Will follow-up next treatment date.    PT POC discussed with Heavenly Santiago DPT

## 2023-04-13 NOTE — NURSING
I notified the pt's daughter that the physician here has completed the paperwork to the best extent that they can and that her PCP will have to complete her admission forms before she is fully accepted to [facility].     It is too late for this pt to discharge to [facility] and her daughter stated that she does not have any furniture at home because it has already been taken to [facility]. PT will have to d/c on tomorrow morning. They also still will not accept her until all of her paperwork is completed.

## 2023-04-13 NOTE — SUBJECTIVE & OBJECTIVE
Interval History: Patient is sitting comfortably in bed. Patient will be discharged to [facility] tomorrow. Patient is in no acute distress at this time.     Review of Systems   Unable to perform ROS: Mental status change   Objective:     Vital Signs (Most Recent):  Temp: 97.6 °F (36.4 °C) (04/13/23 1149)  Pulse: 70 (04/13/23 1149)  Resp: 18 (04/13/23 1149)  BP: (!) 142/71 (04/13/23 1149)  SpO2: 95 % (04/13/23 1149)   Vital Signs (24h Range):  Temp:  [96.7 °F (35.9 °C)-98.6 °F (37 °C)] 97.6 °F (36.4 °C)  Pulse:  [70-86] 70  Resp:  [16-18] 18  SpO2:  [93 %-96 %] 95 %  BP: (119-149)/(57-76) 142/71     Weight: 68 kg (150 lb)  Body mass index is 24.21 kg/m².    Intake/Output Summary (Last 24 hours) at 4/13/2023 1713  Last data filed at 4/13/2023 0507  Gross per 24 hour   Intake 750 ml   Output --   Net 750 ml      Physical Exam  Vitals and nursing note reviewed.   Constitutional:       General: She is not in acute distress.     Appearance: She is normal weight. She is not ill-appearing or diaphoretic.   HENT:      Head: Normocephalic.      Right Ear: External ear normal.      Left Ear: External ear normal.      Nose: Nose normal.      Mouth/Throat:      Pharynx: Oropharynx is clear.   Eyes:      General:         Right eye: No discharge.         Left eye: No discharge.      Conjunctiva/sclera: Conjunctivae normal.   Cardiovascular:      Rate and Rhythm: Normal rate and regular rhythm.      Pulses: Normal pulses.      Heart sounds: Normal heart sounds.   Pulmonary:      Effort: No respiratory distress.      Breath sounds: Normal breath sounds. No wheezing, rhonchi or rales.   Abdominal:      General: Bowel sounds are normal.      Tenderness: There is no guarding or rebound.   Musculoskeletal:         General: No deformity.      Right lower leg: No edema.      Left lower leg: No edema.   Skin:     General: Skin is warm.      Coloration: Skin is not jaundiced.   Neurological:      Mental Status: She is lethargic and  disoriented.   Psychiatric:      Comments: Unable to perform       Significant Labs: All pertinent labs within the past 24 hours have been reviewed.    Significant Imaging: I have reviewed all pertinent imaging results/findings within the past 24 hours.

## 2023-04-14 VITALS
SYSTOLIC BLOOD PRESSURE: 122 MMHG | TEMPERATURE: 98 F | HEIGHT: 66 IN | DIASTOLIC BLOOD PRESSURE: 61 MMHG | BODY MASS INDEX: 24.11 KG/M2 | OXYGEN SATURATION: 95 % | RESPIRATION RATE: 17 BRPM | HEART RATE: 84 BPM | WEIGHT: 150 LBS

## 2023-04-14 LAB
ANION GAP SERPL CALCULATED.3IONS-SCNC: 12 MMOL/L (ref 7–16)
BASOPHILS # BLD AUTO: 0.06 K/UL (ref 0–0.2)
BASOPHILS NFR BLD AUTO: 0.7 % (ref 0–1)
BUN SERPL-MCNC: 7 MG/DL (ref 7–18)
BUN/CREAT SERPL: 10 (ref 6–20)
CALCIUM SERPL-MCNC: 8.5 MG/DL (ref 8.5–10.1)
CHLORIDE SERPL-SCNC: 104 MMOL/L (ref 98–107)
CO2 SERPL-SCNC: 26 MMOL/L (ref 21–32)
CREAT SERPL-MCNC: 0.67 MG/DL (ref 0.55–1.02)
DIFFERENTIAL METHOD BLD: ABNORMAL
EGFR (NO RACE VARIABLE) (RUSH/TITUS): 83 ML/MIN/1.73M²
EOSINOPHIL # BLD AUTO: 0.05 K/UL (ref 0–0.5)
EOSINOPHIL NFR BLD AUTO: 0.6 % (ref 1–4)
ERYTHROCYTE [DISTWIDTH] IN BLOOD BY AUTOMATED COUNT: 13.7 % (ref 11.5–14.5)
GLUCOSE SERPL-MCNC: 110 MG/DL (ref 74–106)
HCT VFR BLD AUTO: 35.1 % (ref 38–47)
HGB BLD-MCNC: 11.3 G/DL (ref 12–16)
IMM GRANULOCYTES # BLD AUTO: 0.03 K/UL (ref 0–0.04)
IMM GRANULOCYTES NFR BLD: 0.3 % (ref 0–0.4)
LYMPHOCYTES # BLD AUTO: 1.94 K/UL (ref 1–4.8)
LYMPHOCYTES NFR BLD AUTO: 21.8 % (ref 27–41)
MCH RBC QN AUTO: 29 PG (ref 27–31)
MCHC RBC AUTO-ENTMCNC: 32.2 G/DL (ref 32–36)
MCV RBC AUTO: 90.2 FL (ref 80–96)
MONOCYTES # BLD AUTO: 0.79 K/UL (ref 0–0.8)
MONOCYTES NFR BLD AUTO: 8.9 % (ref 2–6)
MPC BLD CALC-MCNC: 11.3 FL (ref 9.4–12.4)
NEUTROPHILS # BLD AUTO: 6.01 K/UL (ref 1.8–7.7)
NEUTROPHILS NFR BLD AUTO: 67.7 % (ref 53–65)
NRBC # BLD AUTO: 0 X10E3/UL
NRBC, AUTO (.00): 0 %
PLATELET # BLD AUTO: 203 K/UL (ref 150–400)
POTASSIUM SERPL-SCNC: 3.3 MMOL/L (ref 3.5–5.1)
RBC # BLD AUTO: 3.89 M/UL (ref 4.2–5.4)
SODIUM SERPL-SCNC: 139 MMOL/L (ref 136–145)
WBC # BLD AUTO: 8.88 K/UL (ref 4.5–11)

## 2023-04-14 PROCEDURE — G0378 HOSPITAL OBSERVATION PER HR: HCPCS | Mod: CS

## 2023-04-14 PROCEDURE — 25000003 PHARM REV CODE 250

## 2023-04-14 PROCEDURE — 80048 BASIC METABOLIC PNL TOTAL CA: CPT

## 2023-04-14 PROCEDURE — 25000003 PHARM REV CODE 250: Performed by: INTERNAL MEDICINE

## 2023-04-14 PROCEDURE — 97116 GAIT TRAINING THERAPY: CPT | Mod: CQ

## 2023-04-14 PROCEDURE — 25000003 PHARM REV CODE 250: Performed by: HOSPITALIST

## 2023-04-14 PROCEDURE — 99239 HOSP IP/OBS DSCHRG MGMT >30: CPT | Mod: GC,,, | Performed by: FAMILY MEDICINE

## 2023-04-14 PROCEDURE — 85025 COMPLETE CBC W/AUTO DIFF WBC: CPT

## 2023-04-14 PROCEDURE — 99239 PR HOSPITAL DISCHARGE DAY,>30 MIN: ICD-10-PCS | Mod: GC,,, | Performed by: FAMILY MEDICINE

## 2023-04-14 RX ORDER — POTASSIUM CHLORIDE 20 MEQ/1
20 TABLET, EXTENDED RELEASE ORAL ONCE
Status: DISCONTINUED | OUTPATIENT
Start: 2023-04-14 | End: 2023-04-14 | Stop reason: HOSPADM

## 2023-04-14 RX ADMIN — QUETIAPINE FUMARATE 25 MG: 25 TABLET ORAL at 08:04

## 2023-04-14 RX ADMIN — MEMANTINE HYDROCHLORIDE 5 MG: 5 TABLET, FILM COATED ORAL at 08:04

## 2023-04-14 RX ADMIN — ESCITALOPRAM OXALATE 20 MG: 10 TABLET, FILM COATED ORAL at 08:04

## 2023-04-14 NOTE — PLAN OF CARE
Pt did not dc to [facility] on yesterday due to paperwork needing to be completed by pts pcp. Pt to dc to [facility] today, no needs.

## 2023-04-14 NOTE — DISCHARGE SUMMARY
[facility]  [facility] Medicine  Discharge Summary      Patient Name: Acacia Mcgrath  MRN: [id number]  JOZEF: [id number]  Patient Class: OP- Observation  Admission Date: 4/8/2023  Hospital Length of Stay: 0 days  Discharge Date and Time:  04/14/2023 10:56 AM  Attending Physician: Chavo Schultz Jr., MD   Discharging Provider: Walt Alexandre DO  Primary Care Provider: Molina Hartley MD    Primary Care Team: Networked reference to record PCT     HPI:   Patient is a 89yo female who presents to the [facility] ED via EMS with AMS, decreased appetite, and general weakness. Patient's daughter in room provided all of the history. Patient has a history of Alzheimer's dementia and depression. In the past 3-4 weeks, patient has been having increased sleep (up to 24 hours), decreased energy, inability to walk, confusion, disorientation that have been progressively getting worse. Patient at baseline could communicate and walk by leaning on the wall but she hasn't been able to do so in the past few weeks. Patient needs a walker and cane to walk at baseline but refuses to use them.    In the past week, patient started having left arm pain and decreased motor strength. Patient is usually able to stand up with support of her arms on the chair but hasn't been able to do so in the past week. Patient has had similar episodes about 2 weeks ago that improved but got worse again. 9 days ago, patient had a home visit from her PCP Dr Gomez for similar symptoms and was told she had an UTI. She was treated with Macrobid for 5 days and had 1 day of antibiotic left. Patient lives at home with daughter. Daughter is getting concerned recently about taking care of patient because she couldn't pick her up when she couldn't stand on her own. She is interested in nursing home placement for the patient. Patient has urinary urgency and incontinence due to not being able to walk to the bathroom in time. Daughter denies shakes,  seizures, falls, syncope, history of stroke, MI.    In the ED, vitals 135/59, 98.3, 73, 13, 96% on RA. Labs unremarkable except for Cr 1.1, glucose 117, GFR 48. Lactate 1.4, COVID negative. UA negative for UTI. EKG paced rhythm 69. CT head, CXR , KUB pending. Patient is admitted to hospital medicine for observation and further management.      * No surgery found *      Hospital Course:   Patient was admitted to [facility] for alerted mental status. Patient was living with her daughter and states she cannot take of her anymore unfortunately. Patient has history of dementia. CT Head, KUB and CXR all were unremarkable. UA was also unremarkable, patient was given fluids and improved mentally. Patient will be discharged today to [facility]. Patient has met maximum benefit from this hospitalization and will see her PCP within one week after discharge.        Goals of Care Treatment Preferences:  Code Status: DNR      Consults:   Consults (From admission, onward)        Status Ordering Provider     Inpatient consult to Social Work  Once        Provider:  (Not yet assigned)    Completed CHRIS CARPIO     Inpatient consult to Social Work  Once        Provider:  (Not yet assigned)    Completed CHAVA THOMAS          Neuro  Senile dementia with depression  - Continue home medications   - Continue home Lexapro 20 mg daily  - Continue to monitor  - Social service consulted, thanks for the assistance, patient is being discharged to [facility]         Final Active Diagnoses:    Diagnosis Date Noted POA    PRINCIPAL PROBLEM:  Encephalopathy, metabolic [G93.41] 04/09/2023 Yes    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     Specialty: Family Medicine  Why: Please follow up on April 18 at 2:00  Contact information:  [address]  [place] [address]  [phone]                       Patient Instructions:      Activity as tolerated       Significant Diagnostic Studies: Labs:   BMP:   Recent Labs   Lab 04/14/23  0324   *      K 3.3*      CO2 26   BUN 7   CREATININE 0.67   CALCIUM 8.5   , CMP   Recent Labs   Lab 04/14/23  0324      K 3.3*      CO2 26   *   BUN 7   CREATININE 0.67   CALCIUM 8.5   ANIONGAP 12    and CBC   Recent Labs   Lab 04/14/23  0324   WBC 8.88   HGB 11.3*   HCT 35.1*        Radiology: X-Ray: CXR: X-Ray Chest 1 View (CXR): No results found for this visit on 04/08/23. and X-Ray Chest PA and Lateral (CXR): No results found for this visit on 04/08/23.    Pending Diagnostic Studies:     None         Medications:  Reconciled Home Medications:      Medication List      CONTINUE taking these medications    donepeziL 5 MG tablet  Commonly known as: ARICEPT  Take 1 tablet (5 mg total) by mouth 2 (two) times a day.     EScitalopram oxalate 20 MG tablet  Commonly known as: LEXAPRO  Take 1 tablet (20 mg total) by mouth Daily.     ibandronate 150 mg tablet  Commonly known as: BONIVA  Take 1 tablet (150 mg total) by mouth every 30 days.     memantine 7 mg Cspx  Commonly known as: NAMENDA XR  Take 1 capsule (7 mg total) by mouth 3 (three) times daily.        STOP taking these medications    gabapentin 100 MG capsule  Commonly known as: NEURONTIN     nitrofurantoin (macrocrystal-monohydrate) 100 MG capsule  Commonly known as: MACROBID            Indwelling Lines/Drains at time of discharge:   Lines/Drains/Airways     None                 Time spent on the discharge of patient: 40 minutes         Walt Alexandre DO  Department of Hospital Medicine  [facility]

## 2023-04-14 NOTE — PT/OT/SLP PROGRESS
Physical Therapy Treatment    Patient Name:  Acacia Mcgrath   MRN:  [id number]    Recommendations:     Discharge Recommendations: nursing facility, skilled, intermediate care facility/nursing home  Discharge Equipment Recommendations: none  Barriers to discharge:  Ongoing medical treatment.    Assessment:     Acacia Mcgrath is a 89 y.o. female admitted with a medical diagnosis of Encephalopathy, metabolic.  She presents with the following impairments/functional limitations: weakness, impaired endurance, impaired cognition, decreased safety awareness, impaired functional mobility, impaired self care skills, gait instability, impaired balance     Patient participated in gait training, requiring constant verbal and tactile cues to remain on task and for safety.    Rehab Prognosis: Fair; patient would benefit from acute skilled PT services to address these deficits and reach maximum level of function.    Recent Surgery: * No surgery found *      Plan:     During this hospitalization, patient to be seen 5 x/week to address the identified rehab impairments via gait training, therapeutic activities, therapeutic exercises, neuromuscular re-education and progress toward the following goals:    Plan of Care Expires:  05/11/23    Subjective     Chief Complaint: Encephalopathy  Patient/Family Comments/goals: Patient was agreeable with encouragement.  Pain/Comfort:         Objective:     Communicated with Vishnu Pruett RN prior to session.  Patient found up in chair with   upon PT entry to room.     General Precautions: Standard, fall, hearing impaired  Orthopedic Precautions: N/A  Braces: N/A  Respiratory Status: Room air     Functional Mobility:  Transfers:     Sit to Stand:  supervision  Gait: 82' with minimal assistance with manual assist and gait belt. Decreased step length, slow citlali, constant verbal and tactile cues to remain on task and for safety.      AM-PAC 6 CLICK MOBILITY          Treatment &  Education:      Patient left up in chair with  daughter present..    GOALS:   Multidisciplinary Problems       Physical Therapy Goals          Problem: Physical Therapy    Goal Priority Disciplines Outcome Goal Variances Interventions   Physical Therapy Goal     PT, PT/OT Ongoing, Progressing     Description: Short Term Goals to be met by: 2023    Patient will increase functional independence with mobility by performin. Supine to sit with Stand by assist  2. Sit to stand transfer with Stand by assist using no assistive device  3. Bed to chair transfer with Stand by assist using no assistive device  4. Gait  x 100 feet with Stand by assist using no assistive device  5. Lower extremity exercise program x30 reps per handout, with assistance as needed    Long Term Goals to be met by: 2023    Pt will require <= distant supervision for functional mobility with no assistive device to decrease caregiver burden                       Time Tracking:     PT Received On: 23  PT Start Time: 1001     PT Stop Time: 1018  PT Total Time (min): 17 min     Billable Minutes: Gait Training 10    Treatment Type: Treatment  PT/PTA: PTA     Number of PTA visits since last PT visit: 2023

## 2023-04-14 NOTE — NURSING
AVS summarized with patient's daughter. Daughter at bedside understands meds and upcoming appointments. No PIV at this time.     1225-Patient transferred off unit by [facility] employee. Daughter at bedside.

## 2023-04-14 NOTE — ASSESSMENT & PLAN NOTE
- Continue home medications   - Continue home Lexapro 20 mg daily  - Continue to monitor  - Social service consulted, thanks for the assistance, patient is being discharged to [facility]

## 2023-04-14 NOTE — NURSING
Pt's daughter has confirmed that all paperwork has been checked off and pt has been accepted to [facility]. They will come and pick her up once they finish their morning meeting.

## 2023-04-14 NOTE — NURSING
Patient removed IV. Dr Gutiérrez notified. Patient is to be discharged today to go to [facility]. No new IV placed at this time. Dr rhodes

## 2023-05-20 ENCOUNTER — HOSPITAL ENCOUNTER (EMERGENCY)
Facility: HOSPITAL | Age: 88
Discharge: HOME | End: 2023-05-20
Attending: EMERGENCY MEDICINE
Payer: MEDICARE

## 2023-05-20 VITALS
OXYGEN SATURATION: 95 % | DIASTOLIC BLOOD PRESSURE: 60 MMHG | RESPIRATION RATE: 12 BRPM | TEMPERATURE: 98 F | HEART RATE: 69 BPM | BODY MASS INDEX: 24.21 KG/M2 | SYSTOLIC BLOOD PRESSURE: 146 MMHG | WEIGHT: 150 LBS
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
## 2023-05-20 PROCEDURE — 99284 EMERGENCY DEPT VISIT MOD MDM: CPT | Mod: 25

## 2023-05-20 PROCEDURE — 99283 PR EMERGENCY DEPT VISIT,LEVEL III: ICD-10-PCS | Mod: ,,, | Performed by: EMERGENCY MEDICINE

## 2023-05-20 PROCEDURE — 99283 EMERGENCY DEPT VISIT LOW MDM: CPT | Mod: ,,, | Performed by: EMERGENCY MEDICINE

## 2023-05-20 NOTE — ED TRIAGE NOTES
Pt presents to ED from [facility]; staff states they found her on the floor. Unknown if she hit head. Pt is extremely hard of hearing; cannot tell staff where she is hurting but screams if moved.

## 2023-05-20 NOTE — ED PROVIDER NOTES
Encounter Date: 5/20/2023       History     Chief Complaint   Patient presents with    Fall     89 y/o nursing home resident arrives via ems.  She was reportedly found in the floor this morning.  She has no particular complaints, and she says that she does not hurt.  However, she does have dementia, and she provides no meaningful history.      Review of patient's allergies indicates:  No Known Allergies  Past Medical History:   Diagnosis Date    Cancer     Chronic low back pain     GERD (gastroesophageal reflux disease)     Hyperlipidemia     Hypertension     Other specified types of non-hodgkin lymphoma, unspecified site     Senile dementia with depression     Vitamin D deficiency      Past Surgical History:   Procedure Laterality Date    BONE DENSITY  11/06/2018    CHOLECYSTECTOMY      INSERTION OF PACEMAKER      Dr. Vinayak Whitney     Family History   Problem Relation Age of Onset    Arthritis Mother     Dementia Father     Alzheimer's disease Other     Cancer Other     Heart disease Other     Thyroid disease Other      Social History     Tobacco Use    Smoking status: Never    Smokeless tobacco: Never   Substance Use Topics    Alcohol use: Not Currently    Drug use: Never     Review of Systems   Unable to perform ROS: Dementia     Physical Exam     Initial Vitals [05/20/23 0913]   BP Pulse Resp Temp SpO2   (!) 146/60 69 12 97.6 °F (36.4 °C) 95 %      MAP       --         Physical Exam    Nursing note and vitals reviewed.  Constitutional: She appears well-developed and well-nourished.   HENT:   Head: Normocephalic and atraumatic.   Nose: Nose normal.   Mouth/Throat: Oropharynx is clear and moist.   Eyes: Conjunctivae and EOM are normal. Pupils are equal, round, and reactive to light.   Neck: Neck supple.   Normal range of motion.  Cardiovascular:  Normal rate, regular rhythm, normal heart sounds and intact distal pulses.           Pulmonary/Chest: Breath sounds normal.   Abdominal: Abdomen is soft. Bowel  sounds are normal.   Musculoskeletal:         General: Normal range of motion.      Cervical back: Normal range of motion and neck supple.     Neurological: She is alert. She has normal strength. GCS score is 15. GCS eye subscore is 4. GCS verbal subscore is 5. GCS motor subscore is 6.   Skin: Skin is warm and dry. Capillary refill takes less than 2 seconds.       Medical Screening Exam   See Full Note    ED Course   Procedures  Labs Reviewed - No data to display       Imaging Results              CT Cervical Spine Without Contrast (Final result)  Result time 05/20/23 09:43:11      Final result by Kirk Dhillon DO (05/20/23 09:43:11)                   Impression:      No acute traumatic injury to the cervical spine.      Electronically signed by: Kirk Dhillon  Date:    05/20/2023  Time:    09:43               Narrative:    EXAMINATION:  CT CERVICAL SPINE WITHOUT CONTRAST    CLINICAL HISTORY:  Neck trauma (Age >= 65y);    TECHNIQUE:  Multiplanar CT of the cervical spine without contrast.    COMPARISON:  01/12/2015    FINDINGS:  Mild-to-moderate multilevel degenerative change.    The vertebral height and alignment is normal.    There is no evidence for acute fracture or subluxation.    No prevertebral soft tissue swelling is suggested.    The atlantoaxial and atlantooccipital articulations are normal.    Skull base appears unremarkable.    The lung apices are clear.    The thyroid gland appears normal.                                       CT Head Without Contrast (Final result)  Result time 05/20/23 09:42:00      Final result by Kirk Dhillon DO (05/20/23 09:42:00)                   Impression:      No acute intracranial findings.      Electronically signed by: Kirk Dhillon  Date:    05/20/2023  Time:    09:42               Narrative:    EXAMINATION:  CT HEAD WITHOUT CONTRAST    CLINICAL HISTORY:  Head trauma, minor (Age >= 65y);    TECHNIQUE:  Multiplanar CT imaging from the vertex to skull the  skull base was performed without contrast.    COMPARISON:  04/08/2023    FINDINGS:  Mild chronic small vessel ischemic change    There is no CT evidence of acute intracranial hemorrhage or large territorial infarct. No epidural/subdural hematomas.    There is no evidence of hydrocephalus, midline shift or mass effect.    Scalp, paranasal sinuses, and mastoid air cells are normal.                                       Medications - No data to display  Medical Decision Making:   Initial Assessment:   ELDERLY DEMENTIA PATIENT FOUND IN FLOOR.   Differential Diagnosis:   DDX:  FALL.  RULE OUT INJURY.    Clinical Tests:   Radiological Study: Ordered and Reviewed  ED Management:  DX:  FALL WITHOUT SIGNIFICANT INJURY.  DISCHARGE.                         Clinical Impression:   Final diagnoses:  [W19.XXXA] Fall, initial encounter (Primary)        ED Disposition Condition    Discharge Stable          ED Prescriptions    None       Follow-up Information    None          Vinod Martin MD  06/07/23 8142

## 2023-05-20 NOTE — ED NOTES
Report called to Cynthia Mariscal RN at [facility]. Patient left via [facility] stable and in no distress.

## 2023-05-20 NOTE — ED NOTES
Confirmation #: [id number] patient going back to [facility]. Family at bedside. [facility] documentation for transport completed and sent.

## 2023-05-20 NOTE — ED NOTES
Patient resting in bed when asked to move her feet she requests to be left alone. She does have equal and reactive pupils and firm bilateral hand . She does not appear in any acute distress at this time. Resting in bed with easy respirations and positive pulses x4.

## 2023-05-29 DIAGNOSIS — N39.0 URINARY TRACT INFECTION WITHOUT HEMATURIA, SITE UNSPECIFIED: Primary | ICD-10-CM

## 2023-05-29 RX ORDER — WITCH HAZEL 50 %
1 PADS, MEDICATED (EA) TOPICAL 3 TIMES DAILY
Qty: 90 TABLET | Refills: 3 | Status: SHIPPED | OUTPATIENT
Start: 2023-05-29 | End: 2023-09-26

## 2023-05-29 RX ORDER — CRANBERRY FRUIT 450 MG
450 TABLET ORAL DAILY
Qty: 30 TABLET | Refills: 3 | Status: SHIPPED | OUTPATIENT
Start: 2023-05-29 | End: 2023-09-26

## 2023-06-20 DIAGNOSIS — M81.0 OSTEOPOROSIS, UNSPECIFIED OSTEOPOROSIS TYPE, UNSPECIFIED PATHOLOGICAL FRACTURE PRESENCE: ICD-10-CM

## 2023-06-20 RX ORDER — IBANDRONATE SODIUM 150 MG/1
TABLET, FILM COATED ORAL
Qty: 4 TABLET | Refills: 11 | Status: SHIPPED | OUTPATIENT
Start: 2023-06-20

## 2023-11-18 ENCOUNTER — HOSPITAL ENCOUNTER (EMERGENCY)
Facility: HOSPITAL | Age: 88
Discharge: NURSING, REHAB OR LONG-TERM CARE | End: 2023-11-18
Attending: EMERGENCY MEDICINE
Payer: MEDICARE

## 2023-11-18 VITALS
DIASTOLIC BLOOD PRESSURE: 43 MMHG | SYSTOLIC BLOOD PRESSURE: 100 MMHG | OXYGEN SATURATION: 95 % | RESPIRATION RATE: 18 BRPM | HEIGHT: 66 IN | HEART RATE: 79 BPM | WEIGHT: 150 LBS | BODY MASS INDEX: 24.11 KG/M2 | TEMPERATURE: 98 F

## 2023-11-18 DIAGNOSIS — T14.90XA INJURY: ICD-10-CM
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
## 2023-11-18 PROCEDURE — 99284 PR EMERGENCY DEPT VISIT,LEVEL IV: ICD-10-PCS | Mod: GW,,, | Performed by: EMERGENCY MEDICINE

## 2023-11-18 PROCEDURE — 99284 EMERGENCY DEPT VISIT MOD MDM: CPT | Mod: GW,,, | Performed by: EMERGENCY MEDICINE

## 2023-11-18 PROCEDURE — 99284 EMERGENCY DEPT VISIT MOD MDM: CPT | Mod: 25

## 2023-11-18 NOTE — ED TRIAGE NOTES
Patient arrives via EMS from [facility] where she was found on the floor by staff.  No deformities notes; patient is poor historian.

## 2023-11-18 NOTE — ED PROVIDER NOTES
Encounter Date: 11/18/2023       History     Chief Complaint   Patient presents with    Fall     This is a 89 y/o female, who presents to the ED via EMS for evaluation. EMS states the pt was transported from [facility]. EMS states the pt was found on the floor so there is assumption that the pt fell. There is no hx of the pt being on blood thinners. It is unclear if the pt hit her head or if there was LOC. There are no other complaints/pain in the ED at this time. She has a known hx of GERD, HTN, Hyperlipidemia, Senile dementia with depression and non hodgkins lymphoma.     The history is provided by the EMS personnel. No  was used.     Review of patient's allergies indicates:  No Known Allergies  Past Medical History:   Diagnosis Date    Chronic low back pain     GERD (gastroesophageal reflux disease)     Hyperlipidemia     Hypertension     Non Hodgkin's lymphoma     Senile dementia with depression     Vitamin D deficiency      Past Surgical History:   Procedure Laterality Date    BONE DENSITY  11/06/2018    CHOLECYSTECTOMY      INSERTION OF PACEMAKER      Dr. Vinayak Whitney     Family History   Problem Relation Age of Onset    Arthritis Mother     Dementia Father     Alzheimer's disease Other     Cancer Other     Heart disease Other     Thyroid disease Other      Social History     Tobacco Use    Smoking status: Never    Smokeless tobacco: Never   Substance Use Topics    Alcohol use: Not Currently    Drug use: Never     Review of Systems   Unable to perform ROS: Dementia       Physical Exam     Initial Vitals   BP Pulse Resp Temp SpO2   11/18/23 0156 11/18/23 0157 11/18/23 0156 11/18/23 0202 11/18/23 0157   (!) 131/46 73 18 97.6 °F (36.4 °C) 99 %      MAP       --                Physical Exam    Nursing note and vitals reviewed.  Constitutional: She appears well-developed and well-nourished.   HENT:   Head: Normocephalic and atraumatic.   Eyes: EOM are normal. Pupils are equal, round, and  reactive to light.   Neck: Neck supple. No thyromegaly present.   Normal range of motion.  Cardiovascular:  Normal rate, regular rhythm, normal heart sounds and intact distal pulses.           No murmur heard.  Pulmonary/Chest: Breath sounds normal. She has no wheezes.   Abdominal: Abdomen is soft. Bowel sounds are normal. There is no abdominal tenderness.   Musculoskeletal:         General: Tenderness (Left elbow tenderness but the pt refused xray.) present. No edema. Normal range of motion.      Cervical back: Normal range of motion and neck supple.     Lymphadenopathy:     She has no cervical adenopathy.   Neurological: She is alert and oriented to person, place, and time. She has normal strength and normal reflexes. No cranial nerve deficit or sensory deficit. GCS score is 15. GCS eye subscore is 4. GCS verbal subscore is 5. GCS motor subscore is 6.   Skin: Skin is warm and dry. Capillary refill takes less than 2 seconds. No rash noted.   Psychiatric:   Pt is aggressive toward staff.            ED Course   Procedures  Labs Reviewed - No data to display       Imaging Results              X-Ray KUB (Final result)  Result time 11/18/23 09:36:05      Final result by Jaime Crews II, MD (11/18/23 09:36:05)                   Impression:      No evidence of acute process demonstrated      Electronically signed by: Jaime Crews  Date:    11/18/2023  Time:    09:36               Narrative:    EXAMINATION:  XR KUB    CLINICAL HISTORY:  Abdominal pain  ABD PAIN;    COMPARISON:  9 August 2023    TECHNIQUE:  XR KUB    FINDINGS:  No free fluid or free air seen.  The bowel gas pattern appears within normal limits.  No abnormal calcifications are present.  No other abnormality is identified.                                       X-Ray Chest AP Portable (Final result)  Result time 11/18/23 09:35:39      Final result by Jaime Crews II, MD (11/18/23 09:35:39)                   Impression:      Chronic lung changes.   No acute process or significant change.      Electronically signed by: Jaime Mars  Date:    11/18/2023  Time:    09:35               Narrative:    EXAMINATION:  XR CHEST AP PORTABLE    CLINICAL HISTORY:  Unspecified fall, initial encounter    COMPARISON:  9 April 2023    TECHNIQUE:  XR CHEST AP PORTABLE    FINDINGS:  The heart and mediastinum are stable in size and configuration.    Pacemaker device is unchanged in position.    The pulmonary vascularity is normal in caliber. Lung volumes are increased with prominent bronchial markings.  No lung infiltrates, effusions, pneumothorax or other abnormality is demonstrated.                                    X-Rays:   Independently Interpreted Readings:   Chest X-Ray: No acute changes are seen.  There is significant osteopenia.   Other Readings:  Lumbar spine x-ray:  No fracture seen .  There is significant osteopenia    Medications - No data to display  Medical Decision Making  A 89-year-old female patient who is a resident of [facility] is brought to the emergency department after a fall.  Her x-rays were negative for acute fracture.  We will discharge the patient to the nursing home.    Amount and/or Complexity of Data Reviewed  Radiology: ordered and independent interpretation performed.              Attending Attestation:           Physician Attestation for Scribe:  Physician Attestation Statement for Scribe #1: I, Justen Barrios MD, reviewed documentation, as scribed by Brenda Costa in my presence, and it is both accurate and complete.                                 Clinical Impression:  Final diagnoses:  [T14.90XA] Injury  [W19.XXXA] Fall  [W19.XXXA] Fall, initial encounter (Primary)          ED Disposition Condition    Discharge Stable          ED Prescriptions    None       Follow-up Information       Follow up With Specialties Details Why Contact Info    Molina Hartley MD Family Medicine In 3 days For follow-up [address]  [facility] [address]  [phone]               Justen Barrios MD  11/20/23 0121